# Patient Record
Sex: FEMALE | Race: BLACK OR AFRICAN AMERICAN | NOT HISPANIC OR LATINO | Employment: STUDENT | URBAN - METROPOLITAN AREA
[De-identification: names, ages, dates, MRNs, and addresses within clinical notes are randomized per-mention and may not be internally consistent; named-entity substitution may affect disease eponyms.]

---
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
## 2025-01-04 DIAGNOSIS — F90.0 ADHD (ATTENTION DEFICIT HYPERACTIVITY DISORDER), INATTENTIVE TYPE: ICD-10-CM

## 2025-01-06 RX ORDER — LISDEXAMFETAMINE DIMESYLATE 30 MG/1
30 CAPSULE ORAL EVERY MORNING
Qty: 30 CAPSULE | Refills: 0 | Status: SHIPPED | OUTPATIENT
Start: 2025-01-06

## 2025-01-31 DIAGNOSIS — F90.0 ADHD (ATTENTION DEFICIT HYPERACTIVITY DISORDER), INATTENTIVE TYPE: ICD-10-CM

## 2025-01-31 RX ORDER — LISDEXAMFETAMINE DIMESYLATE 30 MG/1
30 CAPSULE ORAL EVERY MORNING
Qty: 30 CAPSULE | Refills: 0 | Status: SHIPPED | OUTPATIENT
Start: 2025-01-31

## 2025-02-12 ENCOUNTER — PATIENT MESSAGE (OUTPATIENT)
Dept: PSYCHIATRY | Facility: CLINIC | Age: 20
End: 2025-02-12
Payer: OTHER GOVERNMENT

## 2025-03-01 RX ORDER — ESCITALOPRAM OXALATE 10 MG/1
15 TABLET ORAL DAILY
Qty: 135 TABLET | Refills: 1 | Status: SHIPPED | OUTPATIENT
Start: 2025-03-01 | End: 2026-03-01

## 2025-03-02 DIAGNOSIS — F90.0 ADHD (ATTENTION DEFICIT HYPERACTIVITY DISORDER), INATTENTIVE TYPE: ICD-10-CM

## 2025-03-03 RX ORDER — LISDEXAMFETAMINE DIMESYLATE 30 MG/1
30 CAPSULE ORAL EVERY MORNING
Qty: 30 CAPSULE | Refills: 0 | Status: SHIPPED | OUTPATIENT
Start: 2025-03-03 | End: 2025-03-05 | Stop reason: SDUPTHER

## 2025-03-05 ENCOUNTER — TELEPHONE (OUTPATIENT)
Dept: PSYCHIATRY | Facility: CLINIC | Age: 20
End: 2025-03-05
Payer: OTHER GOVERNMENT

## 2025-03-05 ENCOUNTER — OFFICE VISIT (OUTPATIENT)
Dept: PSYCHIATRY | Facility: CLINIC | Age: 20
End: 2025-03-05
Payer: OTHER GOVERNMENT

## 2025-03-05 VITALS
DIASTOLIC BLOOD PRESSURE: 75 MMHG | BODY MASS INDEX: 30.71 KG/M2 | HEIGHT: 61 IN | HEART RATE: 99 BPM | WEIGHT: 162.69 LBS | SYSTOLIC BLOOD PRESSURE: 119 MMHG

## 2025-03-05 DIAGNOSIS — F90.0 ADHD (ATTENTION DEFICIT HYPERACTIVITY DISORDER), INATTENTIVE TYPE: ICD-10-CM

## 2025-03-05 DIAGNOSIS — F41.9 ANXIETY: Primary | ICD-10-CM

## 2025-03-05 DIAGNOSIS — F33.0 MILD EPISODE OF RECURRENT MAJOR DEPRESSIVE DISORDER: ICD-10-CM

## 2025-03-05 PROCEDURE — 99214 OFFICE O/P EST MOD 30 MIN: CPT | Mod: S$PBB,,, | Performed by: PSYCHOLOGIST

## 2025-03-05 PROCEDURE — 99214 OFFICE O/P EST MOD 30 MIN: CPT | Mod: PBBFAC,PO | Performed by: PSYCHOLOGIST

## 2025-03-05 PROCEDURE — 90833 PSYTX W PT W E/M 30 MIN: CPT | Mod: ,,, | Performed by: PSYCHOLOGIST

## 2025-03-05 PROCEDURE — 99999 PR PBB SHADOW E&M-EST. PATIENT-LVL IV: CPT | Mod: PBBFAC,,, | Performed by: PSYCHOLOGIST

## 2025-03-05 PROCEDURE — G2211 COMPLEX E/M VISIT ADD ON: HCPCS | Mod: S$PBB,,, | Performed by: PSYCHOLOGIST

## 2025-03-05 RX ORDER — LISDEXAMFETAMINE DIMESYLATE 30 MG/1
30 CAPSULE ORAL EVERY MORNING
Qty: 30 CAPSULE | Refills: 0 | Status: SHIPPED | OUTPATIENT
Start: 2025-03-05 | End: 2025-03-06 | Stop reason: SDUPTHER

## 2025-03-05 RX ORDER — CLOBETASOL PROPIONATE 0.5 MG/G
OINTMENT TOPICAL
COMMUNITY
Start: 2025-01-23

## 2025-03-05 RX ORDER — ESCITALOPRAM OXALATE 10 MG/1
15 TABLET ORAL DAILY
Qty: 135 TABLET | Refills: 1 | Status: SHIPPED | OUTPATIENT
Start: 2025-03-05 | End: 2026-03-05

## 2025-03-05 NOTE — TELEPHONE ENCOUNTER
Tired calling the pharmacy x 3 kept getting disconnected. Reaching out to obtain information about denial on prescription Vyvanse . Please refer to documentation on 12/6/2024. Cancel and resend brand name only.   Katheryn

## 2025-03-05 NOTE — PROGRESS NOTES
Outpatient Psychiatry Follow-Up Visit    Clinical Status of Patient: Outpatient (Ambulatory)  03/05/2025     Chief Complaint: 19 year old female presenting today for a follow-up.       Interval History and Content of Current Session:  Interim Events/Subjective Report/Content of Current Session:  follow-up appointment.    Pt is a 19 year old female with past psychiatric hx of depression, anxiety, ADHD who presents for follow-up treatment. Pt feels her Vyvanse is working well but pt keeps failing an anatomy lab as she has to spell correctly and she continually makes mistakes on this. Pt is anxious sometimes in social situations and would like to start counseling. Pt believes Lexapro is working as well as it can be. Pt was prescribed Atarax for a derm condition but is taking rarely for anxiety/sleep PRN. Pt denied any other major changes or new stressors.    Past Psychiatric hx: none    Past Medical hx:   Past Medical History:   Diagnosis Date    Narcolepsy         Interim hx:  Medication changes last visit:  increased Lexapro dosage to 15mg Q D  Anxiety: moderate - unchanged  Depression: mild - unchanged     Denies suicidal/homicidal ideations.  Denies hopelessness/worthlessness.    Denies auditory/visual hallucinations      Alcohol: pt denied  Drug: pt denied  Caffeine: minimal use  Tobacco: pt denied      Review of Systems   PSYCHIATRIC: Pertinent items are noted in the narrative.        CONSTITUTIONAL: weight stable    Past Medical, Family and Social History: The patient's past medical, family and social history have been reviewed and updated as appropriate within the electronic medical record. See encounter notes.     Current Psychiatric Medication:  Lexapro 15mg once daily and Vyvanse 30mg Q AM     Compliance: yes      Side effects: pt denied     Risk Parameters:  Patient reports no suicidal ideation  Patient reports no homicidal ideation  Patient reports no self-injurious behavior  Patient reports no violent  behavior     Exam (detailed: at least 9 elements; comprehensive: all 15 elements)   Constitutional  Vitals:  Most recent vital signs, dated less than 90 days prior to this appointment, were reviewed. Pulse:  [99]   BP: (119)/(75)       General:  unremarkable, age appropriate, casual attire, good eye contact, good rapport       Musculoskeletal  Muscle Strength/Tone:  no flaccidity, no tremor    Gait & Station:  normal      Psychiatric                       Speech:  normal tone, normal rate, rhythm, and volume   Mood & Affect:   Depressed, anxious         Thought Process:   Goal directed; Linear    Associations:   intact   Thought Content:   No SI/HI, delusions, or paranoia, no AV/VH   Insight & Judgement:   Good, adequate to circumstances   Orientation:   grossly intact; alert and oriented x 4    Memory:  intact for content of interview    Language:  grossly intact, can repeat    Attention Span  : Grossly intact for content of interview   Fund of Knowledge:   intact and appropriate to age and level of education        Assessment and Diagnosis   Status/Progress: improving     Impression: Pt struggles with moderate depression and anxiety symptoms that are not well managed currently. Pt also reports attention/concentration concerns consistent with ADHD, predominantly inattentive type. Pt explained that her symptoms were present before the age of 12 and are cause impairment in more than one setting. Pt should continue in psychotherapy and psychiatric medication management.     Diagnosis(es):   1) Major Depressive Disorder, recurrent, mild  2) ADHD, predominately inattentive type    Intervention/Counseling/Treatment Plan   Medication Management:      Continue Vyvanse 30mg Q AM and Lexapro 15mg Q D    2. Start counseling      3. Call to report any worsening of symptoms or problems with the medication. Pt instructed to go to ER with thoughts of harming self, others    Psychotherapy:   Target symptoms: depression, anxiety,  attention/concentration  Why chosen therapy is appropriate versus another modality: CBT used; relevant to diagnosis, patient responds to this modality  Outcome monitoring methods: self-report, observation  Therapeutic intervention type: Cognitive Behavioral Therapy  Topics discussed/themes: building skills sets for symptom management, symptom recognition, nutrition, exercise  The patient's response to the intervention is good  Patient's response to treatment is: good.   The patient's progress toward treatment goals: improving  16 minutes spent with pt in psychotherapy and 5 minutes in medication management     Return to clinic: 6 months or earlier PRN    -Cognitive-Behavioral/Supportive therapy and psychoeducation provided  -R/B/SE's of medications discussed with the pt who expresses understanding and chooses to take medications as prescribed.   -Pt instructed to call clinic, 911 or go to nearest emergency room if sxs worsen or pt is in   crisis. The pt expresses understanding.    Hi Bearden, PhD, MP     Visit today included increased complexity associated with the care of the episodic problem associated with mental health. Addressed and managed the longitudinal care of the patient due to the serious and/or complex mental health diagnosis.

## 2025-03-06 DIAGNOSIS — F90.0 ADHD (ATTENTION DEFICIT HYPERACTIVITY DISORDER), INATTENTIVE TYPE: ICD-10-CM

## 2025-03-06 RX ORDER — LISDEXAMFETAMINE DIMESYLATE 30 MG/1
30 CAPSULE ORAL EVERY MORNING
Qty: 30 CAPSULE | Refills: 0 | Status: SHIPPED | OUTPATIENT
Start: 2025-03-06

## 2025-03-12 ENCOUNTER — OFFICE VISIT (OUTPATIENT)
Dept: PSYCHIATRY | Facility: CLINIC | Age: 20
End: 2025-03-12
Payer: OTHER GOVERNMENT

## 2025-03-12 DIAGNOSIS — F41.9 ANXIETY: ICD-10-CM

## 2025-03-12 DIAGNOSIS — F42.4 EXCORIATION (SKIN-PICKING) DISORDER: ICD-10-CM

## 2025-03-12 DIAGNOSIS — F33.0 MILD EPISODE OF RECURRENT MAJOR DEPRESSIVE DISORDER: ICD-10-CM

## 2025-03-12 DIAGNOSIS — F41.1 GAD (GENERALIZED ANXIETY DISORDER): Primary | ICD-10-CM

## 2025-03-12 DIAGNOSIS — F90.0 ADHD (ATTENTION DEFICIT HYPERACTIVITY DISORDER), INATTENTIVE TYPE: ICD-10-CM

## 2025-03-12 PROCEDURE — 99212 OFFICE O/P EST SF 10 MIN: CPT | Mod: PBBFAC,PO | Performed by: SOCIAL WORKER

## 2025-03-12 PROCEDURE — 99999 PR PBB SHADOW E&M-EST. PATIENT-LVL II: CPT | Mod: PBBFAC,,, | Performed by: SOCIAL WORKER

## 2025-03-18 NOTE — PROGRESS NOTES
Psychiatry Initial Visit (PhD/LCSW)  Diagnostic Interview - CPT 73807    Date: 3/12/2025    Site: [facility]     Referral source: Hi Bearden, PhD,     Clinical status of patient: Outpatient    Sarah Hernandez, a 19 y.o. female, for initial evaluation visit.  Met with patient.    Chief complaint/reason for encounter: attention deficit, depression, and anxiety  INTERACTIVE COMPLEXITY:  Expressive communication skills to adequately to explain symptoms and response to treatment are impacted by emotional state, requiring the use of interactive methods and materials to elicit data.    LCSW met with Pt individually for initial evaluation. Pt was informed of LCSW role/responsibilities, purpose of this visit, process of evaluation, and expectations for therapy. LCSW reviewed Pt's rights and Pt provided informed consent for treatment. The following report includes information from interview with Pt, clinical observations, and chart review.    History of present illness: Pt is a 19 year old female with psychiatric hx of depression, anxiety, ADHD who presents to establish psychotherapy care to address social anxiety. Pt neatly dressed, cooperative, reports mood is anxious about school and family. Pt under the care of Dr. Bearden, Psychology, for medication management. She is currently prescribed Vyvanse 30mg in AM and Lexapro 15mg , 1 tablet daily.  Pt feels her Vyvanse is working well but struggles at college with an anatomy lab due to having to spell correctly and pt continues to make spelling errors. Pt also reports feeling anxious in social situations. Pt reports getting overwhelmed when overstimulated. Triggers are usually large crowds, loud noises, food textures, clothing textures, and people moving around. She reports anxiety related to public speaking, speaking in front of class for class presentations as well. Pt has several good friends and sees tem regularly. Closest friends are Daylynn  "and Pavel.  Pt reports therapy with Dr. Otto was very helpful. She received psychotherapy services from Dr. Mason Otto, PsD 8/30/24 through 11/30/24 through the [facility]. Therapy focused on relationship with mother and sister, as well as excoriation and to improve mood.  Pt Diagnoses : MDD, mild ; ADHD,  inattentive ; MARIEL ; Excoriation (skin picking disorder). Dr. Otto recommended pt seek longer term therapy to address family dynamics and self image.    Pt lives at home with parents, older sister (bio mother) and 15 yo younger sister ( bio sister, Dx Autism Spectrum ). Pt older sister has dx of Bipolar Disorder. Pt parents - (maternal Gpx ) are . Father is more comforting. Both parents are retired. They tend to yell, get angry with pt. They are comforting and understanding with pt younger sister. Mom tends to excuse older sister's behaviors. Pt is struggling with a current college course - Anatomy lab because she must spell all of the words correctly or it is counted as being wrong. Pt reports having speech therapy in 1st grade and reports anxiety about speaking, as she gets her words fumbled up, make take longer to answer. Pt also has narcolepsy and gets distracted a lot at home, school and in social situations.  Due to time constraints, will formulate therapy goals with pt at next appt .Will also explore SELU resources that may help pt. Pt denies any current SI/HI. Denies any current A/VH.       From appt with Dr. Otto on 10/3/24:  Patient shares since starting the Lexapro, she has noticed increased brain fog or "dissociation" as she describes. She also notes the urge to want to chew/bite on something. Patient does endorse a calmer state of being however. She notes she is less reactive and has had no anger outburst. Patient was encouraged to remain in communication with her provider as her system adjusts to the new medication. Patient reports her skin picking " "behavior has decreased minimally however her skin is repairing with the consistent use of her cream. Praised patient for engaging in behavior activation plan. Patient also indicated challenges with overstimulation, particularly with loud noises/yelling and particular textures. Patient was provided with resources for overstimulation such as loops and chewables.     From 11/11/24 appt with Dr. Otto: ongoing issues with inattention and her narcolepsy. Recent adjustment were made to her medication so she is waiting to see if that helps. Patient's issues with inattention appear to be better attributed to her anxiety, specifically with anatomy class and the likelihood she will need to retake for the 3rd time. Patient shares she still struggles with the dynamics in her family. She admits to passive feelings of jealousy towards her younger sister due to the consideration her parents and older sister show her in regards to her mental health issues. Patient feels largely dismissed by her older sister.     From 10/16/24 Diagnostic Evaluation with Dr. Bearden: Pt had a somewhat stressful childhood in that her younger sister struggled with Autism and so she felt as if some of her struggles, including attention/concentration concerns, were missed. Pt struggled academically and socially in school and had to endure bullying. Pt was a C student in  and noted longstanding physiological and cognitive worry. Pt was also diagnosed with narcolepsy and treated with low-dose Vyvanse for the past couple of years. Pt is now in her Sophomore year of college and struggling with attention/concentration, as well as her anxiety and depression.    Depression symptoms: "feeling like crap", negative self-talk. Anxiety symptoms: Pt noted excessive worry and high physiological anxiety with infrequent panic attacks. Pt denied symptoms consistent with MARIEL, OCD, phobias, or social anxiety.     Dx: ADHD, inattentive and Major Depressive Disorder, Mild " .            11/21/2024    11:14 AM 11/4/2024    12:34 PM 10/24/2024     5:19 PM 10/10/2024    11:43 AM 9/26/2024    12:13 PM 9/16/2024     9:52 AM 8/29/2024     3:54 PM   Results of the PHQ8   Little interest or pleasure in doing things Several days More than half the days More than half the days Several days More than half the days More than half the days More than half the days   Feeling down, depressed, or hopeless More than half the days Several days Several days Several days More than half the days More than half the days Several days   Trouble falling or staying asleep, or sleeping too much Not at all More than half the days Nearly every day Several days Nearly every day More than half the days Not at all   Feeling tired or having little energy More than half the days More than half the days Nearly every day Nearly every day Several days More than half the days More than half the days   Poor appetite or overeating Nearly every day Several days Nearly every day Nearly every day Nearly every day Nearly every day Several days   Feeling bad about yourself - or that you are a failure or have let yourself or your family down Several days Several days Nearly every day Not at all More than half the days Nearly every day Nearly every day   Trouble concentrating on things, such as reading the newspaper or watching television More than half the days Nearly every day Nearly every day Nearly every day Nearly every day Nearly every day Several days   Moving or speaking so slowly that other people could have noticed. Or the opposite - being so fidgety or restless that you have been moving around a lot more than usual Nearly every day Nearly every day Nearly every day Nearly every day Nearly every day Nearly every day Nearly every day   Total Score  14 15 21 15 19 20 13           11/21/2024    11:13 AM 11/4/2024    12:33 PM 10/24/2024     5:19 PM   GAD7   1. Feeling nervous, anxious, or on edge? 2 2 1   2. Not being able to  stop or control worrying? 1 1 1   3. Worrying too much about different things? 2 2 1   4. Trouble relaxing? 2 2 2   5. Being so restless that it is hard to sit still? 2 3 3   6. Becoming easily annoyed or irritable? 1 1 1   7. Feeling afraid as if something awful might happen? 0 1 0   8. If you checked off any problems, how difficult have these problems made it for you to do your work, take care of things at home, or get along with other people? 1 2 2   MARIEL-7 Score 10  12  9        Patient-reported       Pain: knee pain 4    Symptoms:   Mood: depressed mood, insomnia, fatigue, worthlessness/guilt, poor concentration, and negative self talk. Pt denies any current SI/HI. Denies any current A/VH.     Anxiety: excessive anxiety/worry, restlessness/keyed up, irritability, muscle tension, specific phobias, and social anxiety, nervousness,   Substance abuse: denied  Cognitive functioning:  difficulty with spelling  Health behaviors:  Narcolepsy    Psychiatric history: psychotropic management by PCP, has participated in counseling/psychotherapy on an outpatient basis in the past, and currently under psychiatric care      Medical history:   Past Medical History:   Diagnosis Date    Narcolepsy          Current Outpatient Medications on File Prior to Visit   Medication Sig Dispense Refill    betamethasone dipropionate 0.05 % cream Apply topically 2 (two) times daily. Use sparingly on severe eczema 45 g 0    clobetasol 0.05% (TEMOVATE) 0.05 % Oint Apply topically.      EScitalopram oxalate (LEXAPRO) 10 MG tablet Take 1.5 tablets (15 mg total) by mouth once daily. 135 tablet 1    famotidine (PEPCID) 20 MG tablet Take 1 tablet (20 mg total) by mouth once daily. 30 tablet 2    hydrOXYzine HCL (ATARAX) 25 MG tablet Take 1 tablet (25 mg total) by mouth 3 (three) times daily. 30 tablet 0    loratadine (CLARITIN) 10 mg tablet Take 1 tablet (10 mg total) by mouth once daily. 90 tablet 3    norgestimate-ethinyl estradioL (ORTHO  TRI-CYCLEN LO) 0.18/0.215/0.25 mg-25 mcg tablet Take 1 tablet by mouth once daily. 30 tablet 11    tacrolimus (PROTOPIC) 0.1 % ointment Apply topically 2 (two) times daily. 60 g 0    triamcinolone acetonide 0.1% (KENALOG) 0.1 % cream Apply topically 2 (two) times daily. 45 g 2    VYVANSE 30 mg capsule Take 1 capsule (30 mg total) by mouth every morning. 30 capsule 0     No current facility-administered medications on file prior to visit.       Past Surgical History:   Procedure Laterality Date    LAPAROSCOPIC APPENDECTOMY N/A 6/25/2022    Procedure: APPENDECTOMY, LAPAROSCOPIC;  Surgeon: Milton Alexander MD;  Location: [facility] OR;  Service: General;  Laterality: N/A;    LAPAROSCOPIC CHOLECYSTECTOMY N/A 6/25/2022    Procedure: CHOLECYSTECTOMY, LAPAROSCOPIC;  Surgeon: Milton Alexander MD;  Location: [facility] OR;  Service: General;  Laterality: N/A;     Family history of psychiatric illness:  Mother - Bipolar Disorder ; Sister - Autism Spectrum Disorder    Social history (marriage, employment, etc.): Pt Single, no children. Is Sophomore in college, studying kinesiology at [employer] Pt lives at home with parents, older sister (bio mother) and 17 yo younger sister ( bio sister, Dx Autism Spectrum ). Pt older sister has dx of Bipolar Disorder. Pt parents - (maternal Gpx ) are . Father is more comforting. Both parents are retired. They tend to yell, get angry with pt. They are comforting and understanding with pt younger sister. Mom tends to excuse older sister's behaviors. Pt is struggling with a current college course - Anatomy lab because she must spell all of the words correctly or it is counted as being wrong. Pt reports having speech therapy in 1st grade and reports anxiety about speaking, as she gets her words fumbled up, make take longer to answer. Pt also has narcolepsy and gets distracted a lot at home, school and in social situations.      Substance use:   Alcohol: none   Drugs: none   Tobacco: none   Caffeine:  none    Current medications and drug reactions (include OTC, herbal): see medication list    Strengths and liabilities: Strength: Patient accepts guidance/feedback, Strength: Patient is expressive/articulate., Strength: Patient is intelligent., Strength: Patient is motivated for change., Strength: Patient is physically healthy., Strength: Patient has reasonable judgment., Liability: Patient lacks coping skills., needs more supports re college and family / social.    Current Evaluation:     Mental Status Exam:  General Appearance:  unremarkable, age appropriate   Speech: normal tone, normal rate, normal pitch, normal volume      Level of Cooperation: cooperative      Thought Processes: normal and logical   Mood: anxious      Thought Content: normal, no suicidality, no homicidality, delusions, or paranoia   Affect: congruent and appropriate   Orientation: Oriented x3   Memory: intact   Attention Span & Concentration: intact   Fund of General Knowledge: intact and appropriate to age and level of education   Abstract Reasoning: No concerns noted   Judgment & Insight: fair     Language  intact     Diagnostic Impression - Plan:       ICD-10-CM ICD-9-CM   1. MARIEL (generalized anxiety disorder)  F41.1 300.02   2. Mild episode of recurrent major depressive disorder  F33.0 296.31   3. ADHD (attention deficit hyperactivity disorder), inattentive type  F90.0 314.00   4. Excoriation (skin-picking) disorder  F42.4 698.4   5. Anxiety  F41.9 300.00       Plan:individual psychotherapy and medication management by physician. Pt under the care of Dr. Bearden.   Treatment Goals:  Specify outcomes written in observable, behavioral terms:   Anxiety: reducing negative automatic thoughts, reducing physical symptoms of anxiety, and reducing time spent worrying (<30 minutes/day)  Depression: acquiring relapse prevention skills and reducing negative automatic thoughts    Treatment Plan/Recommendations:   Medication Management: Follow up with  Dr. Bearden.  The treatment plan and follow up plan were reviewed with the patient.  8-349-686-KGVO is the suicide prevention hotline.  If there are thoughts of death, dying, or suicide please call the hotline and go to the ER for evaluation.    Return to Clinic: 1 week, earlier if needed. Pt to go to ED or call 911 if symptoms worsen or if she has thoughts of harming self and /or others. Pt verbalized understanding.       ARVIND BradshawW

## 2025-03-19 ENCOUNTER — OFFICE VISIT (OUTPATIENT)
Dept: PSYCHIATRY | Facility: CLINIC | Age: 20
End: 2025-03-19
Payer: OTHER GOVERNMENT

## 2025-03-19 DIAGNOSIS — F33.0 MILD EPISODE OF RECURRENT MAJOR DEPRESSIVE DISORDER: ICD-10-CM

## 2025-03-19 DIAGNOSIS — F41.1 GAD (GENERALIZED ANXIETY DISORDER): Primary | ICD-10-CM

## 2025-03-19 DIAGNOSIS — F42.4 EXCORIATION (SKIN-PICKING) DISORDER: ICD-10-CM

## 2025-03-19 DIAGNOSIS — F90.0 ADHD (ATTENTION DEFICIT HYPERACTIVITY DISORDER), INATTENTIVE TYPE: ICD-10-CM

## 2025-03-19 PROCEDURE — 90785 PSYTX COMPLEX INTERACTIVE: CPT | Mod: ,,, | Performed by: SOCIAL WORKER

## 2025-03-19 PROCEDURE — 90837 PSYTX W PT 60 MINUTES: CPT | Mod: ,,, | Performed by: SOCIAL WORKER

## 2025-03-19 NOTE — PROGRESS NOTES
Individual Psychotherapy (PhD/LCSW)    3/19/2025    Site:  [facility]         Therapeutic Intervention: Met with patient.  Outpatient - Insight oriented psychotherapy 60 min - CPT code 99495, Outpatient - Behavior modifying psychotherapy 60 min - CPT code 68849, Outpatient - Supportive psychotherapy 60 min - CPT Code 75754, Outpatient - Interactive psychotherapy 60 min - CPT code 46035, and Outpatient - Interactive complexity - CPT code 62501  INTERACTIVE COMPLEXITY:  Expressive communication skills to adequately to explain symptoms and response to treatment are impacted by emotional state, requiring the use of interactive methods and materials to elicit data.    Chief complaint/reason for encounter: depression, anxiety, and school / academic, social, family stress     Interval history and content of current session: First f/up. Pt reports very anxious wanting to move out and not knowing how to tell parents (mg parents). Pt appears anxious, focused on rapport and as session progresses pt able to talk about older sister, who is pt bio mom, being very critical of pt and highly emotionally reactive, gets her way, ad pt younger sister w autism dx able to get what she wants and lots of awards and rewards but pt struggles and is highly criticized by mom and pt older sister.  They will point out everything that can go wrong vs being supportive and helpful. Focused on pt strengths -  and loved to weld. Pt also dating Daylyn, boyfriend and best friend. Pt wants to live on her own and has ideas about finances, where to live. Pt denies any current SI/HI. Denies any current A/VH.       Treatment plan:  Target symptoms: depression, anxiety , adjustment  Why chosen therapy is appropriate versus another modality: relevant to diagnosis, patient responds to this modality, evidence based practice  Outcome monitoring methods: self-report, observation  Therapeutic intervention type: insight oriented psychotherapy, behavior  modifying psychotherapy, supportive psychotherapy, interactive psychotherapy    Risk parameters:  Patient reports no suicidal ideation  Patient reports no homicidal ideation  Patient reports no self-injurious behavior  Patient reports no violent behavior    Verbal deficits:  pt reports hx of speech issues, struggles with getting right words out or slowly processing. Pt sfot spoken as well.     Patient's response to intervention:  The patient's response to intervention is accepting.    Progress toward goals and other mental status changes:  The patient's progress toward goals is fair .    Diagnosis:     ICD-10-CM ICD-9-CM   1. MARIEL (generalized anxiety disorder)  F41.1 300.02   2. Mild episode of recurrent major depressive disorder  F33.0 296.31   3. ADHD (attention deficit hyperactivity disorder), inattentive type  F90.0 314.00   4. Excoriation (skin-picking) disorder  F42.4 698.4     Plan:individual psychotherapy and medication management by physician. Pt under the care of Dr. Bearden.   Treatment Goals:  Specify outcomes written in observable, behavioral terms:   Anxiety: reducing negative automatic thoughts, reducing physical symptoms of anxiety, and reducing time spent worrying (<30 minutes/day)  Depression: acquiring relapse prevention skills and reducing negative automatic thoughts     Treatment Plan/Recommendations:   Medication Management: Follow up with Dr. Bearden.  The treatment plan and follow up plan were reviewed with the patient.  6-622-878-BUXV is the suicide prevention hotline.  If there are thoughts of death, dying, or suicide please call the hotline and go to the ER for evaluation.     Return to Clinic: 1 week, earlier if needed. Pt to go to ED or call 911 if symptoms worsen or if she has thoughts of harming self and /or others. Pt verbalized understanding.   Return to clinic:  1-2 weeks, earlier if needed.     Length of Service (minutes):  55 min

## 2025-03-26 ENCOUNTER — OFFICE VISIT (OUTPATIENT)
Dept: PSYCHIATRY | Facility: CLINIC | Age: 20
End: 2025-03-26
Payer: OTHER GOVERNMENT

## 2025-03-26 DIAGNOSIS — F90.0 ADHD (ATTENTION DEFICIT HYPERACTIVITY DISORDER), INATTENTIVE TYPE: ICD-10-CM

## 2025-03-26 DIAGNOSIS — F42.4 EXCORIATION (SKIN-PICKING) DISORDER: ICD-10-CM

## 2025-03-26 DIAGNOSIS — F43.29 STRESS AND ADJUSTMENT REACTION: ICD-10-CM

## 2025-03-26 DIAGNOSIS — F41.1 GAD (GENERALIZED ANXIETY DISORDER): Primary | ICD-10-CM

## 2025-03-26 DIAGNOSIS — F33.0 MILD EPISODE OF RECURRENT MAJOR DEPRESSIVE DISORDER: ICD-10-CM

## 2025-03-26 PROCEDURE — 90837 PSYTX W PT 60 MINUTES: CPT | Mod: ,,, | Performed by: SOCIAL WORKER

## 2025-03-26 PROCEDURE — 90785 PSYTX COMPLEX INTERACTIVE: CPT | Mod: ,,, | Performed by: SOCIAL WORKER

## 2025-03-26 PROCEDURE — 99999 PR PBB SHADOW E&M-EST. PATIENT-LVL II: CPT | Mod: PBBFAC,,, | Performed by: SOCIAL WORKER

## 2025-03-26 PROCEDURE — 99212 OFFICE O/P EST SF 10 MIN: CPT | Mod: PBBFAC,PO | Performed by: SOCIAL WORKER

## 2025-03-31 NOTE — PROGRESS NOTES
Individual Psychotherapy (PhD/LCSW)    3/26/2025    Site:  [facility]         Therapeutic Intervention: Met with patient.  Outpatient - Insight oriented psychotherapy 60 min - CPT code 84610, Outpatient - Behavior modifying psychotherapy 60 min - CPT code 76306, Outpatient - Supportive psychotherapy 60 min - CPT Code 69964, Outpatient - Interactive psychotherapy 60 min - CPT code 21731, and Outpatient - Interactive complexity - CPT code 79261  INTERACTIVE COMPLEXITY:  Expressive communication skills to adequately to explain symptoms and response to treatment are impacted by emotional state, requiring the use of interactive methods and materials to elicit data.    Chief complaint/reason for encounter: depression, anxiety, and school / academic, social, family stress     Interval history and content of current session: Pt reports she is very anxious and is picking her skin more and more due to her older sister's behavior at home. Pt feeling judged, picked apart, criticized , ignored, reports trying to stay away from older sister as older sister has dramatic moods and behaviors that are attention seeking and critical of pt. Older sister is patient's bio mother and pt feels anxious, tense, overwhelmed at home.  Pt reports a history of being bullied at school and at home re communication and processing things a bit slower. 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 Focused on naming her feelings, processing and solution focused skills , problem solving skills as well as pt strengths and supports. Also discussed independent living skills with pt.  Pt also dating Daylyn, boyfriend and best friend. Pt wants to live on her own and has ideas about finances, where to live. Pt denies any current SI/HI. Denies any current A/VH.       Treatment plan:  Target symptoms: depression, anxiety , adjustment  Why chosen therapy is appropriate versus another modality: relevant to diagnosis,  patient responds to this modality, evidence based practice  Outcome monitoring methods: self-report, observation  Therapeutic intervention type: insight oriented psychotherapy, behavior modifying psychotherapy, supportive psychotherapy, interactive psychotherapy    Risk parameters:  Patient reports no suicidal ideation  Patient reports no homicidal ideation  Patient reports no self-injurious behavior  Patient reports no violent behavior    Verbal deficits:  pt reports hx of speech issues, struggles with getting right words out or slowly processing. Pt sfot spoken as well.     Patient's response to intervention:  The patient's response to intervention is accepting.    Progress toward goals and other mental status changes:  The patient's progress toward goals is fair .    Diagnosis:     ICD-10-CM ICD-9-CM   1. MARIEL (generalized anxiety disorder)  F41.1 300.02   2. Mild episode of recurrent major depressive disorder  F33.0 296.31   3. ADHD (attention deficit hyperactivity disorder), inattentive type  F90.0 314.00   4. Excoriation (skin-picking) disorder  F42.4 698.4   5. Stress and adjustment reaction  F43.29 309.89     Plan:individual psychotherapy and medication management by physician. Pt under the care of Dr. Bearden.   Treatment Goals:  Specify outcomes written in observable, behavioral terms:   Anxiety: reducing negative automatic thoughts, reducing physical symptoms of anxiety, and reducing time spent worrying (<30 minutes/day)  Depression: acquiring relapse prevention skills and reducing negative automatic thoughts     Treatment Plan/Recommendations:   Medication Management: Follow up with Dr. Bearden.  The treatment plan and follow up plan were reviewed with the patient.  7-338-648-PCKX is the suicide prevention hotline.  If there are thoughts of death, dying, or suicide please call the hotline and go to the ER for evaluation.     Return to Clinic: 1 week, earlier if needed. Pt to go to ED or call 911 if symptoms  worsen or if she has thoughts of harming self and /or others. Pt verbalized understanding. Length of Service (minutes):  55 min

## 2025-04-04 DIAGNOSIS — F90.0 ADHD (ATTENTION DEFICIT HYPERACTIVITY DISORDER), INATTENTIVE TYPE: ICD-10-CM

## 2025-04-04 RX ORDER — LISDEXAMFETAMINE DIMESYLATE 30 MG/1
30 CAPSULE ORAL EVERY MORNING
Qty: 30 CAPSULE | Refills: 0 | Status: SHIPPED | OUTPATIENT
Start: 2025-04-04

## 2025-04-16 ENCOUNTER — OFFICE VISIT (OUTPATIENT)
Dept: PSYCHIATRY | Facility: CLINIC | Age: 20
End: 2025-04-16
Payer: OTHER GOVERNMENT

## 2025-04-16 DIAGNOSIS — F41.1 GAD (GENERALIZED ANXIETY DISORDER): Primary | ICD-10-CM

## 2025-04-16 DIAGNOSIS — F42.4 EXCORIATION (SKIN-PICKING) DISORDER: ICD-10-CM

## 2025-04-16 DIAGNOSIS — F41.9 ANXIETY: ICD-10-CM

## 2025-04-16 DIAGNOSIS — F43.29 STRESS AND ADJUSTMENT REACTION: ICD-10-CM

## 2025-04-16 DIAGNOSIS — F33.0 MILD EPISODE OF RECURRENT MAJOR DEPRESSIVE DISORDER: ICD-10-CM

## 2025-04-16 DIAGNOSIS — F90.0 ADHD (ATTENTION DEFICIT HYPERACTIVITY DISORDER), INATTENTIVE TYPE: ICD-10-CM

## 2025-04-16 PROCEDURE — 99212 OFFICE O/P EST SF 10 MIN: CPT | Mod: PBBFAC,PO | Performed by: SOCIAL WORKER

## 2025-04-16 PROCEDURE — 99999 PR PBB SHADOW E&M-EST. PATIENT-LVL II: CPT | Mod: PBBFAC,,, | Performed by: SOCIAL WORKER

## 2025-04-16 PROCEDURE — 90837 PSYTX W PT 60 MINUTES: CPT | Mod: ,,, | Performed by: SOCIAL WORKER

## 2025-04-16 PROCEDURE — 90785 PSYTX COMPLEX INTERACTIVE: CPT | Mod: ,,, | Performed by: SOCIAL WORKER

## 2025-04-21 NOTE — PROGRESS NOTES
Individual Psychotherapy (PhD/LCSW)    4/16/2025    Site:  [facility]         Therapeutic Intervention: Met with patient.  Outpatient - Insight oriented psychotherapy 60 min - CPT code 63405, Outpatient - Behavior modifying psychotherapy 60 min - CPT code 01344, Outpatient - Supportive psychotherapy 60 min - CPT Code 53128, Outpatient - Interactive psychotherapy 60 min - CPT code 27453, and Outpatient - Interactive complexity - CPT code 18419  INTERACTIVE COMPLEXITY:  Expressive communication skills to adequately to explain symptoms and response to treatment are impacted by emotional state, requiring the use of interactive methods and materials to elicit data.    Chief complaint/reason for encounter: depression, anxiety, and school / academic, social, family stress     Interval history and content of current session: Pt casually dressed, appears anxious, down, reports she is feeling down and tired of having to ask parents if she can leave the house or go to a movie, because parents worry about her when she is not home. Feels the house is negative, re mom and older sister. Feeling like they talk over her, minimize any stressor she may mention, therese older sister. Pt is picking her skin more as well.  Pt feeling judged, picked apart, criticized , ignored, reports trying to stay away from older sister , but mom and older sister will make comment about pt going to her room.  Explored assertive communication and I statements. Pt hesitant to express her feelings as they are not well received by mom/ older sister. Pt does feel that her dad is supportive no matter what, but tends to defer to mom.     Pt reports low motivation, was depressed and low in 10th grade and pushed family to find out why pt was always falling asleep - was dx with narcolepsy in 10th grade. Pt reports feeling like something is wrong with her because she has low libido, low interest in sex and based on social media and people in class,high school,  pt telling self that sex should be on her mind a lot. Provided support, validation, reflective  listening and explored with pt diff between low libido, low interest vs aromantic, asexual, as well as explored with pt topics about sexual attraction, gender and family of origin. Pt has mentioned several times and discussed today, that mom and older sister have tried to make pt wear dresses and girlie things her whole life but she feels uncomfortable in clothing like this, prefers neutral or more masculine clothing / appearance. Pt identifies as a straight female at this time , does get upset when family makes judgmental comments about others. Will explore more at next appt.due to being over time today. Focused on self calming, naming her feelings, processing and solution focused skills , problem solving skills as well as pt strengths and supports. Also discussed independent living skills with pt.   Pt denies any current SI/HI. Denies any current A/VH.       Treatment plan:  Target symptoms: depression, anxiety , adjustment  Why chosen therapy is appropriate versus another modality: relevant to diagnosis, patient responds to this modality, evidence based practice  Outcome monitoring methods: self-report, observation  Therapeutic intervention type: insight oriented psychotherapy, behavior modifying psychotherapy, supportive psychotherapy, interactive psychotherapy    Risk parameters:  Patient reports no suicidal ideation  Patient reports no homicidal ideation  Patient reports no self-injurious behavior  Patient reports no violent behavior    Verbal deficits:  pt reports hx of speech issues, struggles with getting right words out or slowly processing. Pt soft spoken as well.     Patient's response to intervention:  The patient's response to intervention is accepting.    Progress toward goals and other mental status changes:  The patient's progress toward goals is fair .    Diagnosis:     ICD-10-CM ICD-9-CM   1. MARIEL  (generalized anxiety disorder)  F41.1 300.02   2. ADHD (attention deficit hyperactivity disorder), inattentive type  F90.0 314.00   3. Mild episode of recurrent major depressive disorder  F33.0 296.31   4. Stress and adjustment reaction  F43.29 309.89   5. Anxiety  F41.9 300.00   6. Excoriation (skin-picking) disorder  F42.4 698.4     Plan:individual psychotherapy and medication management by physician. Pt under the care of Dr. Bearden.   Treatment Goals:  Specify outcomes written in observable, behavioral terms:   Anxiety: reducing negative automatic thoughts, reducing physical symptoms of anxiety, and reducing time spent worrying (<30 minutes/day)  Depression: acquiring relapse prevention skills and reducing negative automatic thoughts     Treatment Plan/Recommendations:   Medication Management: Follow up with Dr. Bearden.  The treatment plan and follow up plan were reviewed with the patient.  0-799-238-AABM is the suicide prevention hotline.  If there are thoughts of death, dying, or suicide please call the hotline and go to the ER for evaluation.     Return to Clinic: 1 week, earlier if needed. Pt to go to ED or call 911 if symptoms worsen or if she has thoughts of harming self and /or others. Pt verbalized understanding. Length of Service (minutes):  70 min

## 2025-04-24 ENCOUNTER — OFFICE VISIT (OUTPATIENT)
Dept: PSYCHIATRY | Facility: CLINIC | Age: 20
End: 2025-04-24
Payer: OTHER GOVERNMENT

## 2025-04-24 DIAGNOSIS — F33.0 MILD EPISODE OF RECURRENT MAJOR DEPRESSIVE DISORDER: ICD-10-CM

## 2025-04-24 DIAGNOSIS — F41.1 GAD (GENERALIZED ANXIETY DISORDER): Primary | ICD-10-CM

## 2025-04-24 DIAGNOSIS — F90.0 ADHD (ATTENTION DEFICIT HYPERACTIVITY DISORDER), INATTENTIVE TYPE: ICD-10-CM

## 2025-04-24 PROCEDURE — 99212 OFFICE O/P EST SF 10 MIN: CPT | Mod: PBBFAC,PO | Performed by: SOCIAL WORKER

## 2025-04-24 PROCEDURE — 90834 PSYTX W PT 45 MINUTES: CPT | Mod: ,,, | Performed by: SOCIAL WORKER

## 2025-04-24 PROCEDURE — 90785 PSYTX COMPLEX INTERACTIVE: CPT | Mod: ,,, | Performed by: SOCIAL WORKER

## 2025-04-24 PROCEDURE — 99999 PR PBB SHADOW E&M-EST. PATIENT-LVL II: CPT | Mod: PBBFAC,,, | Performed by: SOCIAL WORKER

## 2025-04-30 ENCOUNTER — OFFICE VISIT (OUTPATIENT)
Dept: PSYCHIATRY | Facility: CLINIC | Age: 20
End: 2025-04-30
Payer: OTHER GOVERNMENT

## 2025-04-30 DIAGNOSIS — F90.0 ADHD (ATTENTION DEFICIT HYPERACTIVITY DISORDER), INATTENTIVE TYPE: ICD-10-CM

## 2025-04-30 DIAGNOSIS — F41.1 GAD (GENERALIZED ANXIETY DISORDER): Primary | ICD-10-CM

## 2025-04-30 DIAGNOSIS — F33.0 MILD EPISODE OF RECURRENT MAJOR DEPRESSIVE DISORDER: ICD-10-CM

## 2025-04-30 PROCEDURE — 90785 PSYTX COMPLEX INTERACTIVE: CPT | Mod: ,,, | Performed by: SOCIAL WORKER

## 2025-04-30 PROCEDURE — 99999 PR PBB SHADOW E&M-EST. PATIENT-LVL II: CPT | Mod: PBBFAC,,, | Performed by: SOCIAL WORKER

## 2025-04-30 PROCEDURE — 90834 PSYTX W PT 45 MINUTES: CPT | Mod: ,,, | Performed by: SOCIAL WORKER

## 2025-04-30 PROCEDURE — 99212 OFFICE O/P EST SF 10 MIN: CPT | Mod: PBBFAC,PO | Performed by: SOCIAL WORKER

## 2025-04-30 NOTE — PROGRESS NOTES
Individual Psychotherapy (PhD/LCSW)    4/30/2025    Site:  [facility]         Therapeutic Intervention:   Therapeutic Intervention: Met with patient.  Outpatient - Insight oriented psychotherapy 45 min - CPT code 31500, Outpatient - Behavior modifying psychotherapy 45 min - CPT code 97045, Outpatient - Supportive psychotherapy 45 min - CPT Code 72475, Outpatient - Interactive psychotherapy 45 min - CPT code 97645, and Outpatient - Interactive complexity - CPT code 38371      INTERACTIVE COMPLEXITY:  Expressive communication skills to adequately to explain symptoms and response to treatment are impacted by emotional state, requiring the use of interactive methods and materials to elicit data.    Chief complaint/reason for encounter: depression, anxiety, and school / academic, social, family stress     Interval history and content of current session: Pt casually dressed, appears down, reports feeling depressed, down, has low energy, low interest, low motivation.  Pt is picking her skin more as well.  Pt feeling judged, picked apart, criticized , ignored, reports trying to stay away from older sister , but mom and older sister will make comment about pt going to her room.  Explored assertive communication and I statements. Pt hesitant to express her feelings as they are not well received by mom / older sister. Pt does feel that her dad is supportive no matter what, but tends to defer to mom.     Pt reports low motivation and low interest. Feels trapped and stuck at home. Parents do not want her to be out after dark. Has online friends and a few in person friends, limited social outlets. Not interested in med eval at this time. Does feel anxious, sad when she is driving home, will get negative reactions if she shares her feelings or opinions by mom and older sister. Focused on CBT, thinking errors, self calming, naming her feelings, processing and solution focused skills , problem solving skills as well as pt  strengths and supports. Also discussed independent living skills with pt.   Pt denies any current SI/HI. Denies any current A/VH.       Treatment plan:  Target symptoms: depression, anxiety , adjustment  Why chosen therapy is appropriate versus another modality: relevant to diagnosis, patient responds to this modality, evidence based practice  Outcome monitoring methods: self-report, observation  Therapeutic intervention type: insight oriented psychotherapy, behavior modifying psychotherapy, supportive psychotherapy, interactive psychotherapy    Risk parameters:  Patient reports no suicidal ideation  Patient reports no homicidal ideation  Patient reports no self-injurious behavior  Patient reports no violent behavior    Verbal deficits: pt reports hx of speech issues, struggles with getting right words out or slowly processing. Pt soft spoken as well.     Patient's response to intervention:  The patient's response to intervention is accepting.    Progress toward goals and other mental status changes:  The patient's progress toward goals is fair .    ICD-10-CM ICD-9-CM    1. MARIEL (generalized anxiety disorder)  F41.1 300.02       2. ADHD (attention deficit hyperactivity disorder), inattentive type  F90.0 314.00       3. Mild episode of recurrent major depressive disorder  F33.0 296.31             Plan:individual psychotherapy and medication management by physician. Pt under the care of Dr. Bearden.   Treatment Goals:  Specify outcomes written in observable, behavioral terms:   Anxiety: reducing negative automatic thoughts, reducing physical symptoms of anxiety, and reducing time spent worrying (<30 minutes/day)  Depression: acquiring relapse prevention skills and reducing negative automatic thoughts     Treatment Plan/Recommendations:   Medication Management: Follow up with Dr. Bearden.  The treatment plan and follow up plan were reviewed with the patient.  [phone]-XFQP is the suicide prevention hotline.  If there are  thoughts of death, dying, or suicide please call the hotline and go to the ER for evaluation.     Return to Clinic: 1 week, earlier if needed. Pt to go to ED or call 911 if symptoms worsen or if she has thoughts of harming self and /or others. Pt verbalized understanding.   Length of Service (minutes): 50 min

## 2025-04-30 NOTE — PROGRESS NOTES
Individual Psychotherapy (PhD/LCSW)    4/30/2025    Site:  [facility]         Therapeutic Intervention:   Therapeutic Intervention: Met with patient.  Outpatient - Insight oriented psychotherapy 45 min - CPT code 95213, Outpatient - Behavior modifying psychotherapy 45 min - CPT code 47316, Outpatient - Supportive psychotherapy 45 min - CPT Code 67254, Outpatient - Interactive psychotherapy 45 min - CPT code 71985, and Outpatient - Interactive complexity - CPT code 51486      INTERACTIVE COMPLEXITY:  Expressive communication skills to adequately to explain symptoms and response to treatment are impacted by emotional state, requiring the use of interactive methods and materials to elicit data.    Chief complaint/reason for encounter: depression, anxiety, and school / academic, social, family stress     Interval history and content of current session: Pt casually dressed, appears down, reports feeling depressed, down, has low energy, low interest, low motivation.  Pt is picking her skin more as well.  Pt anxious re finals and has gone to tutoring at [employer]. Reports mood is impacted by mom and older's mood, walks on eggshells if sister is home. Pt looking forward to getting job over summer to be around people and have some funds. Encouraged this as well for social , independence / independent living skills, and healthy outlet.  Pt does feel that her dad is supportive no matter what, but tends to defer to mom.   Focused on pt strengths, symptom recognition, CBT, f/up with prescriber. Pt denies any current SI/HI. Denies any current A/VH.          Treatment plan:  Target symptoms: depression, anxiety , adjustment  Why chosen therapy is appropriate versus another modality: relevant to diagnosis, patient responds to this modality, evidence based practice  Outcome monitoring methods: self-report, observation  Therapeutic intervention type: insight oriented psychotherapy, behavior modifying psychotherapy, supportive  psychotherapy, interactive psychotherapy    Risk parameters:  Patient reports no suicidal ideation  Patient reports no homicidal ideation  Patient reports no self-injurious behavior  Patient reports no violent behavior    Verbal deficits: pt reports hx of speech issues, struggles with getting right words out or slowly processing. Pt soft spoken as well.     Patient's response to intervention:  The patient's response to intervention is accepting.    Progress toward goals and other mental status changes:  The patient's progress toward goals is fair .    Diagnosis:     ICD-10-CM ICD-9-CM   1. MARIEL (generalized anxiety disorder)  F41.1 300.02     Plan:individual psychotherapy and medication management by physician. Pt under the care of Dr. Bearden.   Treatment Goals:  Specify outcomes written in observable, behavioral terms:   Anxiety: reducing negative automatic thoughts, reducing physical symptoms of anxiety, and reducing time spent worrying (<30 minutes/day)  Depression: acquiring relapse prevention skills and reducing negative automatic thoughts     Treatment Plan/Recommendations:   Medication Management: Follow up with Dr. Bearden.  The treatment plan and follow up plan were reviewed with the patient.  [phone]-JNTJ is the suicide prevention hotline.  If there are thoughts of death, dying, or suicide please call the hotline and go to the ER for evaluation.     Return to Clinic: 1 week, earlier if needed. Pt to go to ED or call 911 if symptoms worsen or if she has thoughts of harming self and /or others. Pt verbalized understanding. Length of Service (minutes): 45 min

## 2025-05-05 DIAGNOSIS — F90.0 ADHD (ATTENTION DEFICIT HYPERACTIVITY DISORDER), INATTENTIVE TYPE: ICD-10-CM

## 2025-05-05 RX ORDER — LISDEXAMFETAMINE DIMESYLATE 30 MG/1
30 CAPSULE ORAL EVERY MORNING
Qty: 30 CAPSULE | Refills: 0 | Status: SHIPPED | OUTPATIENT
Start: 2025-05-05

## 2025-05-07 ENCOUNTER — OFFICE VISIT (OUTPATIENT)
Dept: PSYCHIATRY | Facility: CLINIC | Age: 20
End: 2025-05-07
Payer: OTHER GOVERNMENT

## 2025-05-07 DIAGNOSIS — F41.1 GAD (GENERALIZED ANXIETY DISORDER): Primary | ICD-10-CM

## 2025-05-07 DIAGNOSIS — F90.0 ADHD (ATTENTION DEFICIT HYPERACTIVITY DISORDER), INATTENTIVE TYPE: ICD-10-CM

## 2025-05-07 DIAGNOSIS — F43.29 STRESS AND ADJUSTMENT REACTION: ICD-10-CM

## 2025-05-07 DIAGNOSIS — F33.0 MILD EPISODE OF RECURRENT MAJOR DEPRESSIVE DISORDER: ICD-10-CM

## 2025-05-07 PROCEDURE — 90785 PSYTX COMPLEX INTERACTIVE: CPT | Mod: ,,, | Performed by: SOCIAL WORKER

## 2025-05-07 PROCEDURE — 99999 PR PBB SHADOW E&M-EST. PATIENT-LVL II: CPT | Mod: PBBFAC,,, | Performed by: SOCIAL WORKER

## 2025-05-07 PROCEDURE — 99212 OFFICE O/P EST SF 10 MIN: CPT | Mod: PBBFAC,PO | Performed by: SOCIAL WORKER

## 2025-05-07 PROCEDURE — 90834 PSYTX W PT 45 MINUTES: CPT | Mod: ,,, | Performed by: SOCIAL WORKER

## 2025-05-07 RX ORDER — ESCITALOPRAM OXALATE 20 MG/1
20 TABLET ORAL DAILY
Qty: 90 TABLET | Refills: 1 | Status: SHIPPED | OUTPATIENT
Start: 2025-05-07 | End: 2026-05-07

## 2025-05-07 NOTE — PROGRESS NOTES
Individual Psychotherapy (PhD/LCSW)    5/7/2025    Site:  [facility]         Therapeutic Intervention: Met with patient.  Outpatient - Insight oriented psychotherapy 60 min - CPT code 60154, Outpatient - Behavior modifying psychotherapy 60 min - CPT code 17624, Outpatient - Supportive psychotherapy 60 min - CPT Code 40818, Outpatient - Interactive psychotherapy 60 min - CPT code 10009, and Outpatient - Interactive complexity - CPT code 88800  INTERACTIVE COMPLEXITY:  Expressive communication skills to adequately to explain symptoms and response to treatment are impacted by emotional state, requiring the use of interactive methods and materials to elicit data.      Chief complaint/reason for encounter: depression, anxiety, and school / academic, social, family stress     Interval history and content of current session: Pt casually dressed, appears down, reports feeling 6/10 moderately depressed, down, has low energy, low interest, low motivation.  Also anxious regarding school finals and completing work to turn in. Pt also picking her skin more and more.  Feels overwhelmed with many tasks she needs to do re meds, appts. Problem solved and helped pt create list of to do's then prioritize . Was able to get pt Lexapro clarified , as she is taking 20 mg since Feb. Also able to get sooner appt with Dr. Bearden, June 4. Pt agrees to do priority task today of calling pharmacy about her Dupixent as this helps the most re her eczema.   If pharmacy does not help re med being covered as it has been, she will call insurance and can ask parent to help if needed. Next - we focused on triggers, finals and pt has talked with professors, received study guides and asked questions for clarification to prepare. Pt is aware of negative self talk and says she disappoints herself. Problem solved re studying skills and preparing for her finals and use of positive self talk and neutral talk. Focused on pt strengths, symptom  "recognition, CBT, f/up with prescriber. Pt denies any current SI/HI. Denies any current A/VH.        Need to explore pt info re pt stating always had hard time spelling and when reading and writing, often still gets her lowercase d's and b's flip flopped or "jumbled", then when she tries to say the words out loud she jumbles her words and feels embarrassed.  Will see if she had IEP or any educational testing at next appt.     Treatment plan:  Target symptoms: depression, anxiety , adjustment  Why chosen therapy is appropriate versus another modality: relevant to diagnosis, patient responds to this modality, evidence based practice  Outcome monitoring methods: self-report, observation  Therapeutic intervention type: insight oriented psychotherapy, behavior modifying psychotherapy, supportive psychotherapy, interactive psychotherapy    Risk parameters:  Patient reports no suicidal ideation  Patient reports no homicidal ideation  Patient reports no self-injurious behavior  Patient reports no violent behavior    Verbal deficits: pt reports hx of speech issues, struggles with getting right words out or slowly processing. Pt soft spoken as well.     Patient's response to intervention:  The patient's response to intervention is accepting.    Progress toward goals and other mental status changes:  The patient's progress toward goals is slowly progressing.    Diagnosis:     ICD-10-CM ICD-9-CM   1. MARIEL (generalized anxiety disorder)  F41.1 300.02   2. Mild episode of recurrent major depressive disorder  F33.0 296.31   3. ADHD (attention deficit hyperactivity disorder), inattentive type  F90.0 314.00   4. Stress and adjustment reaction  F43.29 309.89     Plan:individual psychotherapy and medication management by physician. Pt under the care of Dr. Bearden.   Treatment Goals:  Specify outcomes written in observable, behavioral terms:   Anxiety: reducing negative automatic thoughts, reducing physical symptoms of anxiety, and " reducing time spent worrying (<30 minutes/day)  Depression: acquiring relapse prevention skills and reducing negative automatic thoughts     Treatment Plan/Recommendations:   Medication Management: Follow up with Dr. Bearden.  The treatment plan and follow up plan were reviewed with the patient.  8-460-443-WZVJ is the suicide prevention hotline.  If there are thoughts of death, dying, or suicide please call the hotline and go to the ER for evaluation.     Return to Clinic: 1 week, earlier if needed. Pt to go to ED or call 911 if symptoms worsen or if she has thoughts of harming self and /or others. Pt verbalized understanding.   Length of Service (minutes): 56 min

## 2025-05-19 ENCOUNTER — OFFICE VISIT (OUTPATIENT)
Dept: PSYCHIATRY | Facility: CLINIC | Age: 20
End: 2025-05-19
Payer: OTHER GOVERNMENT

## 2025-05-19 DIAGNOSIS — F41.1 GAD (GENERALIZED ANXIETY DISORDER): Primary | ICD-10-CM

## 2025-05-19 DIAGNOSIS — F43.29 STRESS AND ADJUSTMENT REACTION: ICD-10-CM

## 2025-05-19 DIAGNOSIS — F33.1 MDD (MAJOR DEPRESSIVE DISORDER), RECURRENT EPISODE, MODERATE: ICD-10-CM

## 2025-05-19 DIAGNOSIS — F42.4 EXCORIATION (SKIN-PICKING) DISORDER: ICD-10-CM

## 2025-05-19 DIAGNOSIS — F90.0 ADHD (ATTENTION DEFICIT HYPERACTIVITY DISORDER), INATTENTIVE TYPE: ICD-10-CM

## 2025-05-19 PROCEDURE — 90837 PSYTX W PT 60 MINUTES: CPT | Mod: ,,, | Performed by: SOCIAL WORKER

## 2025-05-19 PROCEDURE — 99999 PR PBB SHADOW E&M-EST. PATIENT-LVL II: CPT | Mod: PBBFAC,,, | Performed by: SOCIAL WORKER

## 2025-05-19 PROCEDURE — 99212 OFFICE O/P EST SF 10 MIN: CPT | Mod: PBBFAC,PO | Performed by: SOCIAL WORKER

## 2025-05-19 PROCEDURE — 90785 PSYTX COMPLEX INTERACTIVE: CPT | Mod: ,,, | Performed by: SOCIAL WORKER

## 2025-05-22 NOTE — PROGRESS NOTES
Individual Psychotherapy (PhD/LCSW)    5/19/2025    Site:  [facility]         Therapeutic Intervention: Met with patient.  Outpatient - Insight oriented psychotherapy 60 min - CPT code 66790, Outpatient - Behavior modifying psychotherapy 60 min - CPT code 38948, Outpatient - Supportive psychotherapy 60 min - CPT Code 87801, Outpatient - Interactive psychotherapy 60 min - CPT code 21611, and Outpatient - Interactive complexity - CPT code 17466  INTERACTIVE COMPLEXITY:  Expressive communication skills to adequately to explain symptoms and response to treatment are impacted by emotional state, requiring the use of interactive methods and materials to elicit data.      Chief complaint/reason for encounter: depression, anxiety, and school / academic, social, family stress     Interval history and content of current session: Pt casually dressed, appears flat today, reports she completed the 3 tasks we wrote down last week and got her medication from specialty pharmacy and is less itchy. Praised her for this. She reports she has an interview at [employer] and is nervous. She has been feeling down and anxious about her college grades , but her parents were very supportive and told her they are fine with her final grades / retaking classes as needed. Pt was feeling down on herself, reports she loves learning kinesiology, but it has been difficult to complete and turn in assignments and struggles with the tests - memorizing hundreds of definitions and spelling are difficult for her and causes her to not do well on tests.   Depression - 5/10 moderate  Explored interests and problem solving. Pt has tried tutoring center at [facility] and they have not had any volunteers to help with her subjects. Pt reports she is a hands on learner and loves the mechanics of the body and how it works. 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 Reports it takes a while for her to process and understanding test questions and then pick an answer.  She is usually last to finish tests. She had a school professional tell her to do the questions she knows first because she would get stuck on a question early in testing and no complete the entire test, so grade would be extremely low. She gets overwhelmed and anxious when taking tests in class, as everyone is getting up and turning in their tests, while she has answered only a few questions.     Discussed academic and developmental history and pt remembers some accommodations for leap testing related to narcolepsy dx.  Also had speech, but does not remember any other academic accommodations or an IEP. Urged pt to ask parents if the have or remember any IEP's or developmental / learning assessments and if so, bring them in next appt so provider can review. Pt reports she does have her cumulative folder from 12th grade and agrees to bring in with her.     Explored academic struggles and pt has very difficult time with spelling, loves mechanical stuff. Is possible interested in prosthetics, Pt tech or PT, OT, OT tech maybe. Pt having to retake a few classes but she passed her 2 labs and her Health class. Explored credits re obtaining her AA, especially if she only has a few credits to complete the AA, and then focus on specific major and can change major as well. Pt reports she will look into how many credits she has.  Pt reports older sister has been in better mood so it has been more calm at the house . Pt denies any current SI/HI. Denies any current A/VH.        From last appt on 5/7/25 - reports feeling 6/10 moderately depressed, down, has low energy, low interest, low motivation.  Also anxious regarding school finals and completing work to turn in. Pt also picking her skin more and more.  Feels overwhelmed with many tasks she needs to do re meds, appts. Problem solved and helped pt create list of to do's then prioritize . Was able to get pt Lexapro clarified , as she is taking 20 mg since Feb. Also able to get  "sooner appt with Dr. Bearden, June 4. Pt agrees to do priority task today of calling pharmacy about her Dupixent as this helps the most re her eczema.   If pharmacy does not help re med being covered as it has been, she will call insurance and can ask parent to help if needed. Next - we focused on triggers, finals and pt has talked with professors, received study guides and asked questions for clarification to prepare. Pt is aware of negative self talk and says she disappoints herself. Problem solved re studying skills and preparing for her finals and use of positive self talk and neutral talk. Focused on pt strengths, symptom recognition, CBT, f/up with prescriber. Pt denies any current SI/HI. Denies any current A/VH.        Need to explore pt info re pt stating always had hard time spelling and when reading and writing, often still gets her lowercase d's and b's flip flopped or "jumbled", then when she tries to say the words out loud she jumbles her words and feels embarrassed.  Will see if she had IEP or any educational testing at next appt.     Treatment plan:  Target symptoms: depression, anxiety , adjustment  Why chosen therapy is appropriate versus another modality: relevant to diagnosis, patient responds to this modality, evidence based practice  Outcome monitoring methods: self-report, observation  Therapeutic intervention type: insight oriented psychotherapy, behavior modifying psychotherapy, supportive psychotherapy, interactive psychotherapy    Risk parameters:  Patient reports no suicidal ideation  Patient reports no homicidal ideation  Patient reports no self-injurious behavior  Patient reports no violent behavior    Verbal deficits: pt reports hx of speech issues, struggles with getting right words out or slowly processing. Pt soft spoken as well.     Patient's response to intervention:  The patient's response to intervention is accepting.    Progress toward goals and other mental status changes:  The " patient's progress toward goals is slowly progressing.    Diagnosis:     ICD-10-CM ICD-9-CM   1. MARIEL (generalized anxiety disorder)  F41.1 300.02   2. MDD (major depressive disorder), recurrent episode, moderate  F33.1 296.32   3. ADHD (attention deficit hyperactivity disorder), inattentive type  F90.0 314.00   4. Stress and adjustment reaction  F43.29 309.89   5. Excoriation (skin-picking) disorder  F42.4 698.4     Plan:individual psychotherapy and medication management by physician. Pt under the care of Dr. Bearden.   Treatment Goals:  Specify outcomes written in observable, behavioral terms:   Anxiety: reducing negative automatic thoughts, reducing physical symptoms of anxiety, and reducing time spent worrying (<30 minutes/day)  Depression: acquiring relapse prevention skills and reducing negative automatic thoughts     Treatment Plan/Recommendations:   Medication Management: Follow up with Dr. Bearden.  The treatment plan and follow up plan were reviewed with the patient.  [phone]-QNWB is the suicide prevention hotline.  If there are thoughts of death, dying, or suicide please call the hotline and go to the ER for evaluation.     Return to Clinic: 1 week, earlier if needed. Pt to go to ED or call 911 if symptoms worsen or if she has thoughts of harming self and /or others. Pt verbalized understanding.   Length of Service (minutes): 58 min

## 2025-06-02 ENCOUNTER — OFFICE VISIT (OUTPATIENT)
Dept: PSYCHIATRY | Facility: CLINIC | Age: 20
End: 2025-06-02
Payer: OTHER GOVERNMENT

## 2025-06-02 DIAGNOSIS — F42.4 EXCORIATION (SKIN-PICKING) DISORDER: ICD-10-CM

## 2025-06-02 DIAGNOSIS — F33.1 MDD (MAJOR DEPRESSIVE DISORDER), RECURRENT EPISODE, MODERATE: Primary | ICD-10-CM

## 2025-06-02 DIAGNOSIS — F41.1 GAD (GENERALIZED ANXIETY DISORDER): ICD-10-CM

## 2025-06-02 DIAGNOSIS — F90.0 ADHD (ATTENTION DEFICIT HYPERACTIVITY DISORDER), INATTENTIVE TYPE: ICD-10-CM

## 2025-06-02 PROCEDURE — 99999 PR PBB SHADOW E&M-EST. PATIENT-LVL II: CPT | Mod: PBBFAC,,, | Performed by: SOCIAL WORKER

## 2025-06-02 PROCEDURE — 90785 PSYTX COMPLEX INTERACTIVE: CPT | Mod: ,,, | Performed by: SOCIAL WORKER

## 2025-06-02 PROCEDURE — 99212 OFFICE O/P EST SF 10 MIN: CPT | Mod: PBBFAC,PO | Performed by: SOCIAL WORKER

## 2025-06-02 PROCEDURE — 90837 PSYTX W PT 60 MINUTES: CPT | Mod: ,,, | Performed by: SOCIAL WORKER

## 2025-06-02 NOTE — PROGRESS NOTES
"Individual Psychotherapy (PhD/LCSW)    6/2/2025    Site:  [facility] [place], operated by [facility]         Therapeutic Intervention: Met with patient.  Outpatient - Insight oriented psychotherapy 60 min - CPT code 16720, Outpatient - Behavior modifying psychotherapy 60 min - CPT code 47500, Outpatient - Supportive psychotherapy 60 min - CPT Code 87138, Outpatient - Interactive psychotherapy 60 min - CPT code 81915, and Outpatient - Interactive complexity - CPT code 78950  INTERACTIVE COMPLEXITY:  Expressive communication skills to adequately to explain symptoms and response to treatment are impacted by emotional state, requiring the use of interactive methods and materials to elicit data.      Chief complaint/reason for encounter: depression, anxiety, and school / academic, social, family stress     Interval history and content of current session: Pt casually dressed, appears flat today, reports mood has been "ok. Iffy" Low motivation, low interest. Low energy.  Older sister behavior has calmed down over past 2 weeks so less arguing and less sensory overload at home. Pt has applied for many jobs, not getting calls back. Explored this and problem solved with pt. Parents are supportive in that they are not pushy about pt getting a job right now. Pt brought copy of [facility] appt with Dr. Otto and reports she has no other developmental or education testing papers. Pt reports feeling invisible, as little sister has obvious language issues, is very childlike, but pt was shy, quiet and she talked to Dr. Otto about this and pt reports they were trying to set up autism testing, but was long wait list. Asked pt to tell me more about this. Pt states her friends tell her she is on the spectrum, and can see her get overwhelmed with too much noise, too many people.  Pt reports she and sister can  not stand certain textures of clothes since they were little. Pt gets upset when mom and older sister push her to wear feminine clothing as pt prefers " soft, baggier tshirts, not form fitting clothes that older sister tends to wear.  Pt reports being bullied at school for years, especially due to speech. Was very shy.       Pt reports anxiety about timed tests as it takes her longer to process and think of the answers. Also states she was in speech for years due to struggling to find the right words and get the right words out.  She is feeling less down re college grades as parents have told her to keep going and trying and they are fine with it. Pt gets stipend from  while pt in school. has been feeling down and anxious about her college grades , but her parents were very supportive and told her they are fine with her final grades / retaking classes as needed. Pt was feeling down on herself, reports she loves learning kinesiology, but it has been difficult to complete and turn in assignments and struggles with the tests - memorizing hundreds of definitions and spelling are difficult for her and causes her to not do well on tests.     Depression - 5-6/10 moderate    Discussed pt upcoming college schedule and potential resources. Also explored interests and hobbies. Pt very good with mechanics of things and learning by hands on activities. Pt has tried tutoring center at [facility] and they have not had any volunteers to help with her subjects. Pt reports she is a hands on learner and loves the mechanics of the body and how it works. 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 Reports it takes a while for her to process and understanding test questions and then pick an answer. She is usually last to finish tests. She had a school professional tell her to do the questions she knows first because she would get stuck on a question early in testing and no complete the entire test, so grade would be extremely low. She gets overwhelmed and anxious when taking tests in class, as everyone is getting up and turning in their tests, while she has answered only a few questions.      Discussed academic and developmental history and pt remembers some accommodations for leap testing related to narcolepsy dx.  Also had speech, but does not remember any other academic accommodations or an IEP. Urged pt to ask parents if the have or remember any IEP's or developmental / learning assessments and if so, bring them in next appt so provider can review. Pt reports she does have her cumulative folder from 12th grade and agrees to bring in with her.     Explored academic struggles and pt has very difficult time with spelling, loves mechanical stuff. Is possible interested in prosthetics, Pt tech or PT, OT, OT tech maybe. Pt having to retake a few classes but she passed her 2 labs and her Health class. Explored credits re obtaining her AA, especially if she only has a few credits to complete the AA, and then focus on specific major and can change major as well. Pt reports she will look into how many credits she has.  Pt reports older sister has been in better mood so it has been more calm at the house . Pt denies any current SI/HI. Denies any current A/VH.  Pt is under the care of Dr. Bearden, [facility],  scheduled to see him this week.          Treatment plan:  Target symptoms: depression, anxiety , adjustment, academic and family dynamics stress  Why chosen therapy is appropriate versus another modality: relevant to diagnosis, patient responds to this modality, evidence based practice  Outcome monitoring methods: self-report, observation  Therapeutic intervention type: insight oriented psychotherapy, behavior modifying psychotherapy, supportive psychotherapy, interactive psychotherapy    Risk parameters:  Patient reports no suicidal ideation  Patient reports no homicidal ideation  Patient reports no self-injurious behavior  Patient reports no violent behavior    Verbal deficits: pt reports hx of speech issues, struggles with getting right words out or slowly processing. Pt soft spoken as well.     Patient's  response to intervention:  The patient's response to intervention is accepting.    Progress toward goals and other mental status changes:  The patient's progress toward goals is slowly progressing.    Diagnosis:     ICD-10-CM ICD-9-CM   1. MDD (major depressive disorder), recurrent episode, moderate  F33.1 296.32   2. ADHD (attention deficit hyperactivity disorder), inattentive type  F90.0 314.00   3. Excoriation (skin-picking) disorder  F42.4 698.4   4. MARIEL (generalized anxiety disorder)  F41.1 300.02     Plan:individual psychotherapy and medication management by physician. Pt under the care of Dr. Bearden.   Treatment Goals:  Specify outcomes written in observable, behavioral terms:   Anxiety: reducing negative automatic thoughts, reducing physical symptoms of anxiety, and reducing time spent worrying (<30 minutes/day)  Depression: acquiring relapse prevention skills and reducing negative automatic thoughts     Treatment Plan/Recommendations:   Medication Management: Follow up with Dr. Bearden.  The treatment plan and follow up plan were reviewed with the patient.  3-522-375-HVHJ is the suicide prevention hotline.  If there are thoughts of death, dying, or suicide please call the hotline and go to the ER for evaluation.     Return to Clinic: 1 week, earlier if needed. Pt to go to ED or call 911 if symptoms worsen or if she has thoughts of harming self and /or others. Pt verbalized understanding.   Length of Service (minutes): 70 min

## 2025-06-04 ENCOUNTER — OFFICE VISIT (OUTPATIENT)
Dept: PSYCHIATRY | Facility: CLINIC | Age: 20
End: 2025-06-04
Payer: OTHER GOVERNMENT

## 2025-06-04 VITALS
WEIGHT: 173.63 LBS | BODY MASS INDEX: 32.78 KG/M2 | DIASTOLIC BLOOD PRESSURE: 76 MMHG | SYSTOLIC BLOOD PRESSURE: 114 MMHG | HEIGHT: 61 IN | HEART RATE: 99 BPM

## 2025-06-04 DIAGNOSIS — F90.0 ADHD (ATTENTION DEFICIT HYPERACTIVITY DISORDER), INATTENTIVE TYPE: Primary | ICD-10-CM

## 2025-06-04 DIAGNOSIS — F33.0 MILD EPISODE OF RECURRENT MAJOR DEPRESSIVE DISORDER: ICD-10-CM

## 2025-06-04 PROCEDURE — 99214 OFFICE O/P EST MOD 30 MIN: CPT | Mod: PBBFAC,PO | Performed by: PSYCHOLOGIST

## 2025-06-04 PROCEDURE — 99999 PR PBB SHADOW E&M-EST. PATIENT-LVL IV: CPT | Mod: PBBFAC,,, | Performed by: PSYCHOLOGIST

## 2025-06-04 PROCEDURE — G2211 COMPLEX E/M VISIT ADD ON: HCPCS | Mod: S$PBB,,, | Performed by: PSYCHOLOGIST

## 2025-06-04 PROCEDURE — 99214 OFFICE O/P EST MOD 30 MIN: CPT | Mod: S$PBB,,, | Performed by: PSYCHOLOGIST

## 2025-06-04 RX ORDER — DESOGESTREL AND ETHINYL ESTRADIOL AND ETHINYL ESTRADIOL 21-5 (28)
1 KIT ORAL
COMMUNITY
Start: 2025-05-08

## 2025-06-04 RX ORDER — LISDEXAMFETAMINE DIMESYLATE 30 MG/1
30 CAPSULE ORAL EVERY MORNING
Qty: 30 CAPSULE | Refills: 0 | Status: SHIPPED | OUTPATIENT
Start: 2025-06-04

## 2025-06-04 RX ORDER — SERTRALINE HYDROCHLORIDE 50 MG/1
TABLET, FILM COATED ORAL
Qty: 60 TABLET | Refills: 1 | Status: SHIPPED | OUTPATIENT
Start: 2025-06-04

## 2025-06-04 RX ORDER — DUPILUMAB 300 MG/2ML
INJECTION, SOLUTION SUBCUTANEOUS
COMMUNITY
Start: 2025-05-22

## 2025-06-10 ENCOUNTER — PATIENT MESSAGE (OUTPATIENT)
Dept: PSYCHIATRY | Facility: CLINIC | Age: 20
End: 2025-06-10
Payer: OTHER GOVERNMENT

## 2025-06-11 ENCOUNTER — OFFICE VISIT (OUTPATIENT)
Dept: PSYCHIATRY | Facility: CLINIC | Age: 20
End: 2025-06-11
Payer: OTHER GOVERNMENT

## 2025-06-11 DIAGNOSIS — F90.0 ADHD (ATTENTION DEFICIT HYPERACTIVITY DISORDER), INATTENTIVE TYPE: ICD-10-CM

## 2025-06-11 DIAGNOSIS — F42.4 EXCORIATION (SKIN-PICKING) DISORDER: ICD-10-CM

## 2025-06-11 DIAGNOSIS — F41.1 GAD (GENERALIZED ANXIETY DISORDER): ICD-10-CM

## 2025-06-11 DIAGNOSIS — F33.1 MDD (MAJOR DEPRESSIVE DISORDER), RECURRENT EPISODE, MODERATE: Primary | ICD-10-CM

## 2025-06-11 PROCEDURE — 99999 PR PBB SHADOW E&M-EST. PATIENT-LVL I: CPT | Mod: PBBFAC,,, | Performed by: SOCIAL WORKER

## 2025-06-11 PROCEDURE — 90785 PSYTX COMPLEX INTERACTIVE: CPT | Mod: ,,, | Performed by: SOCIAL WORKER

## 2025-06-11 PROCEDURE — 90837 PSYTX W PT 60 MINUTES: CPT | Mod: ,,, | Performed by: SOCIAL WORKER

## 2025-06-11 PROCEDURE — 99211 OFF/OP EST MAY X REQ PHY/QHP: CPT | Mod: PBBFAC,PO | Performed by: SOCIAL WORKER

## 2025-06-13 ENCOUNTER — PATIENT MESSAGE (OUTPATIENT)
Dept: PSYCHIATRY | Facility: CLINIC | Age: 20
End: 2025-06-13
Payer: OTHER GOVERNMENT

## 2025-06-16 ENCOUNTER — OFFICE VISIT (OUTPATIENT)
Dept: PSYCHIATRY | Facility: CLINIC | Age: 20
End: 2025-06-16
Payer: OTHER GOVERNMENT

## 2025-06-16 DIAGNOSIS — F90.0 ADHD (ATTENTION DEFICIT HYPERACTIVITY DISORDER), INATTENTIVE TYPE: ICD-10-CM

## 2025-06-16 DIAGNOSIS — F43.29 STRESS AND ADJUSTMENT REACTION: ICD-10-CM

## 2025-06-16 DIAGNOSIS — F42.4 EXCORIATION (SKIN-PICKING) DISORDER: ICD-10-CM

## 2025-06-16 DIAGNOSIS — F33.1 MDD (MAJOR DEPRESSIVE DISORDER), RECURRENT EPISODE, MODERATE: Primary | ICD-10-CM

## 2025-06-16 DIAGNOSIS — F41.1 GAD (GENERALIZED ANXIETY DISORDER): ICD-10-CM

## 2025-06-16 PROCEDURE — 99999 PR PBB SHADOW E&M-EST. PATIENT-LVL I: CPT | Mod: PBBFAC,,, | Performed by: SOCIAL WORKER

## 2025-06-16 PROCEDURE — 99211 OFF/OP EST MAY X REQ PHY/QHP: CPT | Mod: PBBFAC,PO | Performed by: SOCIAL WORKER

## 2025-06-16 PROCEDURE — 90785 PSYTX COMPLEX INTERACTIVE: CPT | Mod: ,,, | Performed by: SOCIAL WORKER

## 2025-06-16 PROCEDURE — 90837 PSYTX W PT 60 MINUTES: CPT | Mod: ,,, | Performed by: SOCIAL WORKER

## 2025-06-18 NOTE — PROGRESS NOTES
"Individual Psychotherapy (PhD/LCSW)    6/11/2025    [facility] [place]         Therapeutic Intervention: Met with patient.  Outpatient - Insight oriented psychotherapy 60 min - CPT code 06719, Outpatient - Behavior modifying psychotherapy 60 min - CPT code 97408, Outpatient - Supportive psychotherapy 60 min - CPT Code 61332, Outpatient - Interactive psychotherapy 60 min - CPT code 96912, and Outpatient - Interactive complexity - CPT code 87969  INTERACTIVE COMPLEXITY:  Expressive communication skills to adequately to explain symptoms and response to treatment are impacted by emotional state, requiring the use of interactive methods and materials to elicit data.      Chief complaint/reason for encounter: depression, anxiety, and school / academic, social, family stress     Interval history and content of current session: Pt casually dressed, appears flat today, reports mood has been "low motivation. I haven't found a job and my mom says don't worry but I do." Reports feeling guilty that she wants some positive interaction, attention from her mom, but  mom focuses on older sister due to older sister's mood and behavior swings /shifts. Pt reports feeling anxious, feels fast heart rate, overwhelmed easily and hyperventilates when she worries or gets upset at home. Pt reports overthinking, will feel nauseous when nervous.  Parents do try to help pt feel more calm. Pt goal for future is to move out and wants parents to help her learn more about this vs her older sister that is middle aged and has been on her own before. Reports feeling "really scared. Really nervous with the criticism of my older sister". Provided support, validation and CBT- self talk, auto thoughts, how to cope and assertive communication / scripts to help. Offered family session in future as well. Pt denies any current SI/HI. Denies any current A/VH.     Pt is under the care of Dr. Bearden, [facility],  saw him this week and going through med change. "   Current Outpatient Medications on File Prior to Visit   Medication Sig Dispense Refill    betamethasone dipropionate 0.05 % cream Apply topically 2 (two) times daily. Use sparingly on severe eczema 45 g 0    clobetasol 0.05% (TEMOVATE) 0.05 % Oint Apply topically.      DUPIXENT  mg/2 mL PnIj       famotidine (PEPCID) 20 MG tablet Take 1 tablet (20 mg total) by mouth once daily. 30 tablet 2    hydrOXYzine HCL (ATARAX) 25 MG tablet Take 1 tablet (25 mg total) by mouth 3 (three) times daily. 30 tablet 0    loratadine (CLARITIN) 10 mg tablet Take 1 tablet (10 mg total) by mouth once daily. 90 tablet 3    sertraline (ZOLOFT) 50 MG tablet Take 0.5 tab (25mg) Q HS for four nights, then take 1 tab (50mg) Q HS for three nights, then take 2 tabs (100mg) Q HS 60 tablet 1    tacrolimus (PROTOPIC) 0.1 % ointment Apply topically 2 (two) times daily. 60 g 0    triamcinolone acetonide 0.1% (KENALOG) 0.1 % cream Apply topically 2 (two) times daily. 45 g 2    VIORELE, 28, 0.15-0.02 mgx21 /0.01 mg x 5 per tablet Take 1 tablet by mouth.      VYVANSE 30 mg capsule Take 1 capsule (30 mg total) by mouth every morning. 30 capsule 0     No current facility-administered medications on file prior to visit.      Treatment plan:  Target symptoms: depression, anxiety , adjustment, academic and family dynamics stress, social,   Why chosen therapy is appropriate versus another modality: relevant to diagnosis, patient responds to this modality, evidence based practice  Outcome monitoring methods: self-report, observation  Therapeutic intervention type: insight oriented psychotherapy, behavior modifying psychotherapy, supportive psychotherapy, interactive psychotherapy    Risk parameters:  Patient reports no suicidal ideation  Patient reports no homicidal ideation  Patient reports no self-injurious behavior  Patient reports no violent behavior    Verbal deficits: pt reports hx of speech issues, struggles with getting right words out or  slowly processing. Pt soft spoken as well.     Patient's response to intervention:  The patient's response to intervention is accepting.    Progress toward goals and other mental status changes:  The patient's progress toward goals is positive progress.    Diagnosis:     ICD-10-CM ICD-9-CM   1. MDD (major depressive disorder), recurrent episode, moderate  F33.1 296.32   2. Excoriation (skin-picking) disorder  F42.4 698.4   3. MARIEL (generalized anxiety disorder)  F41.1 300.02   4. ADHD (attention deficit hyperactivity disorder), inattentive type  F90.0 314.00     Plan:individual psychotherapy and medication management by physician. Pt under the care of Dr. Bearden.   Treatment Goals:  Specify outcomes written in observable, behavioral terms:   Anxiety: reducing negative automatic thoughts, reducing physical symptoms of anxiety, and reducing time spent worrying (<30 minutes/day)  Depression: acquiring relapse prevention skills and reducing negative automatic thoughts     Treatment Plan/Recommendations:   Medication Management: Follow up with Dr. Bearden.  The treatment plan and follow up plan were reviewed with the patient.  3-727-794-PWVN is the suicide prevention hotline.  If there are thoughts of death, dying, or suicide please call the hotline and go to the ER for evaluation.     Return to Clinic: 1 week, earlier if needed. Pt to go to ED or call 911 if symptoms worsen or if she has thoughts of harming self and /or others. Pt verbalized understanding.   Length of Service (minutes): 60 min

## 2025-06-19 NOTE — PROGRESS NOTES
"Individual Psychotherapy (PhD/LCSW)    6/16/2025    Site:  [facility]         Therapeutic Intervention: Met with patient.  Outpatient - Insight oriented psychotherapy 60 min - CPT code 64748, Outpatient - Behavior modifying psychotherapy 60 min - CPT code 23667, Outpatient - Supportive psychotherapy 60 min - CPT Code 72523, Outpatient - Interactive psychotherapy 60 min - CPT code 20414, and Outpatient - Interactive complexity - CPT code 84026  INTERACTIVE COMPLEXITY:  Expressive communication skills to adequately to explain symptoms and response to treatment are impacted by emotional state, requiring the use of interactive methods and materials to elicit data.      Chief complaint/reason for encounter: depression, anxiety, and school / academic, social, family stress     Interval history and content of current session: Pt casually dressed, appears flat today, reports mood has been "a little Iffy, a little better. My mom's been telling me to get out with my friends, but I worry about the finances." Pt reports "feeling less paranoid about what people think. My depression is still bad but not as bad."  Pt worried about school and how hard it is for her, wants hands on, more mechanical type of learning and career. Has not talked to parents about this due to fearing criticism. Provided support, validation and CBT- self talk, auto thoughts, how to cope and assertive communication / scripts to help. Offered family session in future as well. Pt meeting friends for her birthday and is excited about this. Will hang out and get food together. Has job interview at [employer]. Used CBT to relabel and reframe.  Pt denies any current SI/HI. Denies any current A/VH.     Pt is under the care of Dr. Bearden, [facility],  saw him this week and going through med change.   Current Outpatient Medications on File Prior to Visit   Medication Sig Dispense Refill    betamethasone dipropionate 0.05 % cream Apply topically 2 (two) times " daily. Use sparingly on severe eczema 45 g 0    clobetasol 0.05% (TEMOVATE) 0.05 % Oint Apply topically.      DUPIXENT  mg/2 mL PnIj       famotidine (PEPCID) 20 MG tablet Take 1 tablet (20 mg total) by mouth once daily. 30 tablet 2    hydrOXYzine HCL (ATARAX) 25 MG tablet Take 1 tablet (25 mg total) by mouth 3 (three) times daily. 30 tablet 0    loratadine (CLARITIN) 10 mg tablet Take 1 tablet (10 mg total) by mouth once daily. 90 tablet 3    sertraline (ZOLOFT) 50 MG tablet Take 0.5 tab (25mg) Q HS for four nights, then take 1 tab (50mg) Q HS for three nights, then take 2 tabs (100mg) Q HS 60 tablet 1    tacrolimus (PROTOPIC) 0.1 % ointment Apply topically 2 (two) times daily. 60 g 0    triamcinolone acetonide 0.1% (KENALOG) 0.1 % cream Apply topically 2 (two) times daily. 45 g 2    VIORELE, 28, 0.15-0.02 mgx21 /0.01 mg x 5 per tablet Take 1 tablet by mouth.      VYVANSE 30 mg capsule Take 1 capsule (30 mg total) by mouth every morning. 30 capsule 0     No current facility-administered medications on file prior to visit.      Treatment plan:  Target symptoms: depression, anxiety , adjustment, academic and family dynamics stress, social,   Why chosen therapy is appropriate versus another modality: relevant to diagnosis, patient responds to this modality, evidence based practice  Outcome monitoring methods: self-report, observation  Therapeutic intervention type: insight oriented psychotherapy, behavior modifying psychotherapy, supportive psychotherapy, interactive psychotherapy    Risk parameters:  Patient reports no suicidal ideation  Patient reports no homicidal ideation  Patient reports no self-injurious behavior  Patient reports no violent behavior    Verbal deficits: pt reports hx of speech issues, struggles with getting right words out or slowly processing. Pt soft spoken as well.     Patient's response to intervention:  The patient's response to intervention is accepting.    Progress toward goals and  other mental status changes:  The patient's progress toward goals is positive progress.    Diagnosis:     ICD-10-CM ICD-9-CM   1. MDD (major depressive disorder), recurrent episode, moderate  F33.1 296.32   2. MARIEL (generalized anxiety disorder)  F41.1 300.02   3. ADHD (attention deficit hyperactivity disorder), inattentive type  F90.0 314.00   4. Excoriation (skin-picking) disorder  F42.4 698.4   5. Stress and adjustment reaction  F43.29 309.89     Plan:individual psychotherapy and medication management by physician. Pt under the care of Dr. Bearden.   Treatment Goals:  Specify outcomes written in observable, behavioral terms:   Anxiety: reducing negative automatic thoughts, reducing physical symptoms of anxiety, and reducing time spent worrying (<30 minutes/day)  Depression: acquiring relapse prevention skills and reducing negative automatic thoughts     Treatment Plan/Recommendations:   Medication Management: Follow up with Dr. Bearden.  The treatment plan and follow up plan were reviewed with the patient.  5-013-173-ITSO is the suicide prevention hotline.  If there are thoughts of death, dying, or suicide please call the hotline and go to the ER for evaluation.     Return to Clinic: 1 week, earlier if needed. Pt to go to ED or call 911 if symptoms worsen or if she has thoughts of harming self and /or others. Pt verbalized understanding.   Length of Service (minutes): 60 min

## 2025-06-20 ENCOUNTER — PATIENT MESSAGE (OUTPATIENT)
Dept: PSYCHIATRY | Facility: CLINIC | Age: 20
End: 2025-06-20
Payer: OTHER GOVERNMENT

## 2025-06-23 ENCOUNTER — OFFICE VISIT (OUTPATIENT)
Dept: PSYCHIATRY | Facility: CLINIC | Age: 20
End: 2025-06-23
Payer: OTHER GOVERNMENT

## 2025-06-23 DIAGNOSIS — F90.0 ADHD (ATTENTION DEFICIT HYPERACTIVITY DISORDER), INATTENTIVE TYPE: ICD-10-CM

## 2025-06-23 DIAGNOSIS — F41.1 GAD (GENERALIZED ANXIETY DISORDER): ICD-10-CM

## 2025-06-23 DIAGNOSIS — F33.1 MDD (MAJOR DEPRESSIVE DISORDER), RECURRENT EPISODE, MODERATE: Primary | ICD-10-CM

## 2025-06-23 PROCEDURE — 90785 PSYTX COMPLEX INTERACTIVE: CPT | Mod: ,,, | Performed by: SOCIAL WORKER

## 2025-06-23 PROCEDURE — 99212 OFFICE O/P EST SF 10 MIN: CPT | Mod: PBBFAC,PO | Performed by: SOCIAL WORKER

## 2025-06-23 PROCEDURE — 99999 PR PBB SHADOW E&M-EST. PATIENT-LVL II: CPT | Mod: PBBFAC,,, | Performed by: SOCIAL WORKER

## 2025-06-23 PROCEDURE — 90834 PSYTX W PT 45 MINUTES: CPT | Mod: ,,, | Performed by: SOCIAL WORKER

## 2025-06-30 NOTE — PROGRESS NOTES
"Individual Psychotherapy (PhD/LCSW)    6/23/2025    Site:  [facility]         Therapeutic Intervention: Met with patient.  Outpatient - Insight oriented psychotherapy 60 min - CPT code 74475, Outpatient - Behavior modifying psychotherapy 60 min - CPT code 77420, Outpatient - Supportive psychotherapy 60 min - CPT Code 19906, Outpatient - Interactive psychotherapy 60 min - CPT code 40290, and Outpatient - Interactive complexity - CPT code 56848  INTERACTIVE COMPLEXITY:  Expressive communication skills to adequately to explain symptoms and response to treatment are impacted by emotional state, requiring the use of interactive methods and materials to elicit data.      Chief complaint/reason for encounter: depression, anxiety, and school / academic, social, family stress     Interval history and content of current session: Pt casually dressed, reports mood has been "a little better", reports anxiety re communicating pt feelings and goals with parents and wanting parents to help pt become more independent, to teach her, vs focus having to be on her older sister (bio mom in the home) Pt also worried about school and career, likes mechanical hands on work, good with setting up electronics as well.   Provided support, validation and CBT- self talk, looking at best and worst case scenario and likelihood , awareness of auto thoughts, how to cope and assertive communication / scripts to help. Offered family session in future as well. Pt had good time with friends last week . Used CBT to relabel and reframe.  Pt denies any current SI/HI. Denies any current A/VH.     Pt is under the care of Dr. Bearden, [facility],  saw him this week and going through med change.   Current Outpatient Medications on File Prior to Visit   Medication Sig Dispense Refill    betamethasone dipropionate 0.05 % cream Apply topically 2 (two) times daily. Use sparingly on severe eczema 45 g 0    clobetasol 0.05% (TEMOVATE) 0.05 % Oint Apply topically.      " DUPIXENT  mg/2 mL PnIj       famotidine (PEPCID) 20 MG tablet Take 1 tablet (20 mg total) by mouth once daily. 30 tablet 2    hydrOXYzine HCL (ATARAX) 25 MG tablet Take 1 tablet (25 mg total) by mouth 3 (three) times daily. 30 tablet 0    loratadine (CLARITIN) 10 mg tablet Take 1 tablet (10 mg total) by mouth once daily. 90 tablet 3    sertraline (ZOLOFT) 50 MG tablet Take 0.5 tab (25mg) Q HS for four nights, then take 1 tab (50mg) Q HS for three nights, then take 2 tabs (100mg) Q HS 60 tablet 1    tacrolimus (PROTOPIC) 0.1 % ointment Apply topically 2 (two) times daily. 60 g 0    triamcinolone acetonide 0.1% (KENALOG) 0.1 % cream Apply topically 2 (two) times daily. 45 g 2    VIORELE, 28, 0.15-0.02 mgx21 /0.01 mg x 5 per tablet Take 1 tablet by mouth.      VYVANSE 30 mg capsule Take 1 capsule (30 mg total) by mouth every morning. 30 capsule 0     No current facility-administered medications on file prior to visit.      Treatment plan:  Target symptoms: depression, anxiety , adjustment, academic and family dynamics stress, social,   Why chosen therapy is appropriate versus another modality: relevant to diagnosis, patient responds to this modality, evidence based practice  Outcome monitoring methods: self-report, observation  Therapeutic intervention type: insight oriented psychotherapy, behavior modifying psychotherapy, supportive psychotherapy, interactive psychotherapy    Risk parameters:  Patient reports no suicidal ideation  Patient reports no homicidal ideation  Patient reports no self-injurious behavior  Patient reports no violent behavior    Verbal deficits: pt reports hx of speech issues, struggles with getting right words out or slowly processing. Pt soft spoken as well.     Patient's response to intervention:  The patient's response to intervention is accepting.    Progress toward goals and other mental status changes:  The patient's progress toward goals is positive progress.    Diagnosis:      ICD-10-CM ICD-9-CM   1. MDD (major depressive disorder), recurrent episode, moderate  F33.1 296.32   2. MARIEL (generalized anxiety disorder)  F41.1 300.02   3. ADHD (attention deficit hyperactivity disorder), inattentive type  F90.0 314.00     Plan:individual psychotherapy and medication management by physician. Pt under the care of Dr. Bearden.   Treatment Goals:  Specify outcomes written in observable, behavioral terms:   Anxiety: reducing negative automatic thoughts, reducing physical symptoms of anxiety, and reducing time spent worrying (<30 minutes/day)  Depression: acquiring relapse prevention skills and reducing negative automatic thoughts     Treatment Plan/Recommendations:   Medication Management: Follow up with Dr. Bearden.  The treatment plan and follow up plan were reviewed with the patient.  2-533-172-BJQS is the suicide prevention hotline.  If there are thoughts of death, dying, or suicide please call the hotline and go to the ER for evaluation.     Return to Clinic: 1 week, earlier if needed. Pt to go to ED or call 911 if symptoms worsen or if she has thoughts of harming self and /or others. Pt verbalized understanding.   Length of Service (minutes): 60 min

## 2025-07-01 ENCOUNTER — PATIENT MESSAGE (OUTPATIENT)
Dept: PSYCHIATRY | Facility: CLINIC | Age: 20
End: 2025-07-01
Payer: OTHER GOVERNMENT

## 2025-07-02 ENCOUNTER — OFFICE VISIT (OUTPATIENT)
Dept: PSYCHIATRY | Facility: CLINIC | Age: 20
End: 2025-07-02
Payer: OTHER GOVERNMENT

## 2025-07-02 DIAGNOSIS — F90.0 ADHD (ATTENTION DEFICIT HYPERACTIVITY DISORDER), INATTENTIVE TYPE: ICD-10-CM

## 2025-07-02 DIAGNOSIS — F41.1 GAD (GENERALIZED ANXIETY DISORDER): ICD-10-CM

## 2025-07-02 DIAGNOSIS — F33.1 MDD (MAJOR DEPRESSIVE DISORDER), RECURRENT EPISODE, MODERATE: Primary | ICD-10-CM

## 2025-07-02 PROCEDURE — 90785 PSYTX COMPLEX INTERACTIVE: CPT | Mod: ,,, | Performed by: SOCIAL WORKER

## 2025-07-02 PROCEDURE — 99212 OFFICE O/P EST SF 10 MIN: CPT | Mod: PBBFAC,PO | Performed by: SOCIAL WORKER

## 2025-07-02 PROCEDURE — 90837 PSYTX W PT 60 MINUTES: CPT | Mod: ,,, | Performed by: SOCIAL WORKER

## 2025-07-02 PROCEDURE — 99999 PR PBB SHADOW E&M-EST. PATIENT-LVL II: CPT | Mod: PBBFAC,,, | Performed by: SOCIAL WORKER

## 2025-07-02 NOTE — PROGRESS NOTES
Individual Psychotherapy (PhD/LCSW)    7/2/2025    Site:  [facility]         Therapeutic Intervention: Met with patient.  Outpatient - Insight oriented psychotherapy 60 min - CPT code 31956, Outpatient - Behavior modifying psychotherapy 60 min - CPT code 26361, Outpatient - Supportive psychotherapy 60 min - CPT Code 45247, Outpatient - Interactive psychotherapy 60 min - CPT code 26980, and Outpatient - Interactive complexity - CPT code 12244  INTERACTIVE COMPLEXITY:  Expressive communication skills to adequately to explain symptoms and response to treatment are impacted by emotional state, requiring the use of interactive methods and materials to elicit data.      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 Parents have verbalized they are proud of her and she is looking forward to working. Will go there today. Was hired on the spot after the interview. They asked questions and pt told them about her work ethic, morals and values and they were impressed. Provided support and praise for pt as she has been anxious about verbal responses in interviews. Older sister is being negative about the job and pt able to let it roll of her shoulder a but more this week. Reports noticing medicine seems to be helping more with her depression and anxiety. Also headaches stopped.    Provided support, validation and CBT- self talk, looking at best and worst case scenario and likelihood , awareness of auto thoughts, how to cope and how to ask for help or clarification. Used CBT to relabel and reframe.  Pt denies any current SI/HI. Denies any current A/VH.     Pt is under the care of Dr. Bearden, [facility],  saw him this week and going through med change.   Current Outpatient  Medications on File Prior to Visit   Medication Sig Dispense Refill    betamethasone dipropionate 0.05 % cream Apply topically 2 (two) times daily. Use sparingly on severe eczema 45 g 0    clobetasol 0.05% (TEMOVATE) 0.05 % Oint Apply topically.      DUPIXENT  mg/2 mL PnIj       famotidine (PEPCID) 20 MG tablet Take 1 tablet (20 mg total) by mouth once daily. 30 tablet 2    hydrOXYzine HCL (ATARAX) 25 MG tablet Take 1 tablet (25 mg total) by mouth 3 (three) times daily. 30 tablet 0    loratadine (CLARITIN) 10 mg tablet Take 1 tablet (10 mg total) by mouth once daily. 90 tablet 3    sertraline (ZOLOFT) 50 MG tablet Take 0.5 tab (25mg) Q HS for four nights, then take 1 tab (50mg) Q HS for three nights, then take 2 tabs (100mg) Q HS 60 tablet 1    tacrolimus (PROTOPIC) 0.1 % ointment Apply topically 2 (two) times daily. 60 g 0    triamcinolone acetonide 0.1% (KENALOG) 0.1 % cream Apply topically 2 (two) times daily. 45 g 2    VIORELE, 28, 0.15-0.02 mgx21 /0.01 mg x 5 per tablet Take 1 tablet by mouth.      VYVANSE 30 mg capsule Take 1 capsule (30 mg total) by mouth every morning. 30 capsule 0     No current facility-administered medications on file prior to visit.      Treatment plan:  Target symptoms: depression, anxiety , adjustment, academic and family dynamics stress, social,   Why chosen therapy is appropriate versus another modality: relevant to diagnosis, patient responds to this modality, evidence based practice  Outcome monitoring methods: self-report, observation  Therapeutic intervention type: insight oriented psychotherapy, behavior modifying psychotherapy, supportive psychotherapy, interactive psychotherapy    Risk parameters:  Patient reports no suicidal ideation  Patient reports no homicidal ideation  Patient reports no self-injurious behavior  Patient reports no violent behavior    Verbal deficits: pt reports hx of speech issues, struggles with getting right words out or slowly processing. Pt  soft spoken as well.     Patient's response to intervention:  The patient's response to intervention is accepting.    Progress toward goals and other mental status changes:  The patient's progress toward goals is positive progress.    Diagnosis:     ICD-10-CM ICD-9-CM   1. MDD (major depressive disorder), recurrent episode, moderate  F33.1 296.32   2. MARIEL (generalized anxiety disorder)  F41.1 300.02   3. ADHD (attention deficit hyperactivity disorder), inattentive type  F90.0 314.00     Plan:individual psychotherapy and medication management by physician. Pt under the care of Dr. Bearden.   Treatment Goals:  Specify outcomes written in observable, behavioral terms:   Anxiety: reducing negative automatic thoughts, reducing physical symptoms of anxiety, and reducing time spent worrying (<30 minutes/day)  Depression: acquiring relapse prevention skills and reducing negative automatic thoughts     Treatment Plan/Recommendations:   Medication Management: Follow up with Dr. Bearden.  The treatment plan and follow up plan were reviewed with the patient.  2-204-909-FYPE is the suicide prevention hotline.  If there are thoughts of death, dying, or suicide please call the hotline and go to the ER for evaluation.     Return to Clinic: 1 week, earlier if needed. Pt to go to ED or call 911 if symptoms worsen or if she has thoughts of harming self and /or others. Pt verbalized understanding.   Length of Service (minutes): 60 min

## 2025-07-03 DIAGNOSIS — F90.0 ADHD (ATTENTION DEFICIT HYPERACTIVITY DISORDER), INATTENTIVE TYPE: ICD-10-CM

## 2025-07-07 RX ORDER — LISDEXAMFETAMINE DIMESYLATE 30 MG/1
30 CAPSULE ORAL EVERY MORNING
Qty: 30 CAPSULE | Refills: 0 | Status: SHIPPED | OUTPATIENT
Start: 2025-07-07

## 2025-07-07 RX ORDER — SERTRALINE HYDROCHLORIDE 50 MG/1
TABLET, FILM COATED ORAL
Qty: 60 TABLET | Refills: 1 | Status: SHIPPED | OUTPATIENT
Start: 2025-07-07

## 2025-07-15 ENCOUNTER — PATIENT MESSAGE (OUTPATIENT)
Dept: PSYCHIATRY | Facility: CLINIC | Age: 20
End: 2025-07-15
Payer: OTHER GOVERNMENT

## 2025-07-16 ENCOUNTER — OFFICE VISIT (OUTPATIENT)
Dept: PSYCHIATRY | Facility: CLINIC | Age: 20
End: 2025-07-16
Payer: OTHER GOVERNMENT

## 2025-07-16 DIAGNOSIS — F33.0 MILD EPISODE OF RECURRENT MAJOR DEPRESSIVE DISORDER: ICD-10-CM

## 2025-07-16 DIAGNOSIS — F90.0 ADHD (ATTENTION DEFICIT HYPERACTIVITY DISORDER), INATTENTIVE TYPE: Primary | ICD-10-CM

## 2025-07-16 PROCEDURE — 99212 OFFICE O/P EST SF 10 MIN: CPT | Mod: PBBFAC,PO | Performed by: PSYCHOLOGIST

## 2025-07-16 PROCEDURE — G2211 COMPLEX E/M VISIT ADD ON: HCPCS | Mod: ,,, | Performed by: PSYCHOLOGIST

## 2025-07-16 PROCEDURE — 99999 PR PBB SHADOW E&M-EST. PATIENT-LVL II: CPT | Mod: PBBFAC,,, | Performed by: PSYCHOLOGIST

## 2025-07-16 PROCEDURE — 99213 OFFICE O/P EST LOW 20 MIN: CPT | Mod: S$PBB,,, | Performed by: PSYCHOLOGIST

## 2025-07-16 RX ORDER — SERTRALINE HYDROCHLORIDE 100 MG/1
100 TABLET, FILM COATED ORAL NIGHTLY
Qty: 30 TABLET | Refills: 4 | Status: SHIPPED | OUTPATIENT
Start: 2025-07-16 | End: 2026-07-16

## 2025-07-16 RX ORDER — LISDEXAMFETAMINE DIMESYLATE 30 MG/1
30 CAPSULE ORAL EVERY MORNING
Qty: 30 CAPSULE | Refills: 0 | Status: SHIPPED | OUTPATIENT
Start: 2025-07-16

## 2025-07-16 NOTE — LETTER
July 16, 2025        Shannan Lozoya DO  [address] [facility] [address]             [facility]  [address]  [place] [address]  Phone: [phone]   Patient: Sarah Hernandez   MR Number: [id number]   YOB: 2005   Date of Visit: 7/16/2025       Dear Dr. Lozoya:    Thank you for referring Sarah Hernandez to me for evaluation. Below are the relevant portions of my assessment and plan of care.    Pt stable on Zoloft 100mg Q HS and Vyvanse 30mg Q AM. Will continue to monitor and follow.    If you have questions, please do not hesitate to call me. I look forward to following Sarah along with you.    Sincerely,      Hi Bearden, PhD, MP           CC  No Recipients

## 2025-07-16 NOTE — PROGRESS NOTES
Outpatient Psychiatry Follow-Up Visit    Clinical Status of Patient: Outpatient (Ambulatory)  07/16/2025     Chief Complaint: 20 year old female presenting today for a follow-up.       Interval History and Content of Current Session:  Interim Events/Subjective Report/Content of Current Session:  follow-up appointment.    Pt is a 20 year old female with past psychiatric hx of depression, anxiety, ADHD who presents for follow-up treatment. Pt feels better on Zoloft. Pt said that she is sleeping better and her emotions are not so flat during the day, as it was on Lexapro. Pt continues in therapy. Pt denied any other major changes or new stressors.    Past Psychiatric hx: Lexapro    Past Medical hx:   Past Medical History:   Diagnosis Date    Narcolepsy         Interim hx:  Medication changes last visit:  cross-titrated from Lexapro to Zoloft 100mg Q HS  Anxiety: moderate - increased  Depression: mild - increased     Denies suicidal/homicidal ideations.  Denies hopelessness/worthlessness.    Denies auditory/visual hallucinations      Alcohol: pt denied  Drug: pt denied  Caffeine: minimal use  Tobacco: pt denied      Review of Systems   PSYCHIATRIC: Pertinent items are noted in the narrative.        CONSTITUTIONAL: weight stable    Past Medical, Family and Social History: The patient's past medical, family and social history have been reviewed and updated as appropriate within the electronic medical record. See encounter notes.     Current Psychiatric Medication:  Zoloft 100mg Q HS and Vyvanse 30mg Q AM     Compliance: yes      Side effects: pt denied     Risk Parameters:  Patient reports no suicidal ideation  Patient reports no homicidal ideation  Patient reports no self-injurious behavior  Patient reports no violent behavior     Exam (detailed: at least 9 elements; comprehensive: all 15 elements)   Constitutional  Vitals:  Most recent vital signs, dated less than 90 days prior to this appointment, were reviewed.         General:  unremarkable, age appropriate, casual attire, good eye contact, good rapport       Musculoskeletal  Muscle Strength/Tone:  no flaccidity, no tremor    Gait & Station:  normal      Psychiatric                       Speech:  normal tone, normal rate, rhythm, and volume   Mood & Affect:   Depressed, anxious         Thought Process:   Goal directed; Linear    Associations:   intact   Thought Content:   No SI/HI, delusions, or paranoia, no AV/VH   Insight & Judgement:   Good, adequate to circumstances   Orientation:   grossly intact; alert and oriented x 4    Memory:  intact for content of interview    Language:  grossly intact, can repeat    Attention Span  : Grossly intact for content of interview   Fund of Knowledge:   intact and appropriate to age and level of education        Assessment and Diagnosis   Status/Progress: improving     Impression: Pt struggles with moderate depression and anxiety symptoms that are not well managed currently. Pt also reports attention/concentration concerns consistent with ADHD, predominantly inattentive type. Pt explained that her symptoms were present before the age of 12 and are cause impairment in more than one setting. Pt should continue in psychotherapy and psychiatric medication management.     Diagnosis(es):   1) Major Depressive Disorder, recurrent, mild  2) ADHD, predominately inattentive type    Intervention/Counseling/Treatment Plan   Medication Management:      Continue Vyvanse 30mg in the morning and Zoloft 100mg Q HS    2.  Continue in counseling      3. Call to report any worsening of symptoms or problems with the medication. Pt instructed to go to ER with thoughts of harming self, others    Psychotherapy:   Target symptoms: depression, anxiety, attention/concentration  Why chosen therapy is appropriate versus another modality: CBT used; relevant to diagnosis, patient responds to this modality  Outcome monitoring methods: self-report, observation  Therapeutic  intervention type: Cognitive Behavioral Therapy  Topics discussed/themes: building skills sets for symptom management, symptom recognition, nutrition, exercise  The patient's response to the intervention is good  Patient's response to treatment is: good.   The patient's progress toward treatment goals: improving     Return to clinic: 4 months or earlier PRN    -Cognitive-Behavioral/Supportive therapy and psychoeducation provided  -R/B/SE's of medications discussed with the pt who expresses understanding and chooses to take medications as prescribed.   -Pt instructed to call clinic, 911 or go to nearest emergency room if sxs worsen or pt is in   crisis. The pt expresses understanding.    Hi Bearden, PhD, MP     Visit today included increased complexity associated with the care of the episodic problem associated with mental health. Addressed and managed the longitudinal care of the patient due to the serious and/or complex mental health diagnosis.

## 2025-08-06 ENCOUNTER — PATIENT MESSAGE (OUTPATIENT)
Dept: PSYCHIATRY | Facility: CLINIC | Age: 20
End: 2025-08-06
Payer: OTHER GOVERNMENT

## 2025-08-06 DIAGNOSIS — F90.0 ADHD (ATTENTION DEFICIT HYPERACTIVITY DISORDER), INATTENTIVE TYPE: ICD-10-CM

## 2025-08-07 ENCOUNTER — OFFICE VISIT (OUTPATIENT)
Dept: PSYCHIATRY | Facility: CLINIC | Age: 20
End: 2025-08-07
Payer: OTHER GOVERNMENT

## 2025-08-07 DIAGNOSIS — F42.4 EXCORIATION (SKIN-PICKING) DISORDER: ICD-10-CM

## 2025-08-07 DIAGNOSIS — F41.1 GAD (GENERALIZED ANXIETY DISORDER): ICD-10-CM

## 2025-08-07 DIAGNOSIS — F33.1 MDD (MAJOR DEPRESSIVE DISORDER), RECURRENT EPISODE, MODERATE: ICD-10-CM

## 2025-08-07 DIAGNOSIS — F90.0 ADHD (ATTENTION DEFICIT HYPERACTIVITY DISORDER), INATTENTIVE TYPE: Primary | ICD-10-CM

## 2025-08-07 PROCEDURE — 99999 PR PBB SHADOW E&M-EST. PATIENT-LVL II: CPT | Mod: PBBFAC,,, | Performed by: SOCIAL WORKER

## 2025-08-07 PROCEDURE — 99212 OFFICE O/P EST SF 10 MIN: CPT | Mod: PBBFAC,PO | Performed by: SOCIAL WORKER

## 2025-08-07 PROCEDURE — 90834 PSYTX W PT 45 MINUTES: CPT | Mod: ,,, | Performed by: SOCIAL WORKER

## 2025-08-07 PROCEDURE — 90785 PSYTX COMPLEX INTERACTIVE: CPT | Mod: ,,, | Performed by: SOCIAL WORKER

## 2025-08-07 RX ORDER — LISDEXAMFETAMINE DIMESYLATE 30 MG/1
30 CAPSULE ORAL EVERY MORNING
Qty: 30 CAPSULE | Refills: 0 | Status: SHIPPED | OUTPATIENT
Start: 2025-08-07

## 2025-08-14 ENCOUNTER — OFFICE VISIT (OUTPATIENT)
Dept: PSYCHIATRY | Facility: CLINIC | Age: 20
End: 2025-08-14
Payer: OTHER GOVERNMENT

## 2025-08-14 DIAGNOSIS — F90.0 ADHD (ATTENTION DEFICIT HYPERACTIVITY DISORDER), INATTENTIVE TYPE: ICD-10-CM

## 2025-08-14 DIAGNOSIS — F33.1 MDD (MAJOR DEPRESSIVE DISORDER), RECURRENT EPISODE, MODERATE: Primary | ICD-10-CM

## 2025-08-14 DIAGNOSIS — F42.4 EXCORIATION (SKIN-PICKING) DISORDER: ICD-10-CM

## 2025-08-14 DIAGNOSIS — F41.1 GAD (GENERALIZED ANXIETY DISORDER): ICD-10-CM

## 2025-08-14 PROCEDURE — 99211 OFF/OP EST MAY X REQ PHY/QHP: CPT | Mod: PBBFAC,PO | Performed by: SOCIAL WORKER

## 2025-08-14 PROCEDURE — 90834 PSYTX W PT 45 MINUTES: CPT | Mod: ,,, | Performed by: SOCIAL WORKER

## 2025-08-14 PROCEDURE — 90785 PSYTX COMPLEX INTERACTIVE: CPT | Mod: ,,, | Performed by: SOCIAL WORKER

## 2025-08-14 PROCEDURE — 99999 PR PBB SHADOW E&M-EST. PATIENT-LVL I: CPT | Mod: PBBFAC,,, | Performed by: SOCIAL WORKER

## 2025-08-20 ENCOUNTER — PATIENT MESSAGE (OUTPATIENT)
Dept: PSYCHIATRY | Facility: CLINIC | Age: 20
End: 2025-08-20
Payer: OTHER GOVERNMENT

## 2025-08-20 PROBLEM — R29.898 LOWER EXTREMITY WEAKNESS: Status: ACTIVE | Noted: 2025-08-20

## 2025-08-20 PROBLEM — M25.551 RIGHT HIP PAIN: Status: ACTIVE | Noted: 2025-08-20

## 2025-08-20 PROBLEM — M25.561 RIGHT KNEE PAIN: Status: ACTIVE | Noted: 2025-08-20

## 2025-08-20 PROBLEM — M25.571 RIGHT ANKLE PAIN: Status: ACTIVE | Noted: 2025-08-20

## 2025-08-21 ENCOUNTER — OFFICE VISIT (OUTPATIENT)
Dept: PSYCHIATRY | Facility: CLINIC | Age: 20
End: 2025-08-21
Payer: OTHER GOVERNMENT

## 2025-08-21 DIAGNOSIS — F90.0 ADHD (ATTENTION DEFICIT HYPERACTIVITY DISORDER), INATTENTIVE TYPE: ICD-10-CM

## 2025-08-21 DIAGNOSIS — F41.1 GAD (GENERALIZED ANXIETY DISORDER): ICD-10-CM

## 2025-08-21 DIAGNOSIS — F33.1 MDD (MAJOR DEPRESSIVE DISORDER), RECURRENT EPISODE, MODERATE: Primary | ICD-10-CM

## 2025-08-21 DIAGNOSIS — F42.4 EXCORIATION (SKIN-PICKING) DISORDER: ICD-10-CM

## 2025-08-21 PROCEDURE — 90834 PSYTX W PT 45 MINUTES: CPT | Mod: ,,, | Performed by: SOCIAL WORKER

## 2025-08-21 PROCEDURE — 90785 PSYTX COMPLEX INTERACTIVE: CPT | Mod: ,,, | Performed by: SOCIAL WORKER

## 2025-08-21 PROCEDURE — 99212 OFFICE O/P EST SF 10 MIN: CPT | Mod: PBBFAC,PO | Performed by: SOCIAL WORKER

## 2025-08-21 PROCEDURE — 99999 PR PBB SHADOW E&M-EST. PATIENT-LVL II: CPT | Mod: PBBFAC,,, | Performed by: SOCIAL WORKER

## 2025-08-27 ENCOUNTER — PATIENT MESSAGE (OUTPATIENT)
Dept: PSYCHIATRY | Facility: CLINIC | Age: 20
End: 2025-08-27
Payer: OTHER GOVERNMENT

## 2025-08-28 ENCOUNTER — TELEPHONE (OUTPATIENT)
Dept: PSYCHIATRY | Facility: CLINIC | Age: 20
End: 2025-08-28
Payer: OTHER GOVERNMENT

## 2025-09-04 DIAGNOSIS — F90.0 ADHD (ATTENTION DEFICIT HYPERACTIVITY DISORDER), INATTENTIVE TYPE: ICD-10-CM

## 2025-09-04 RX ORDER — LISDEXAMFETAMINE DIMESYLATE 30 MG/1
30 CAPSULE ORAL EVERY MORNING
Qty: 30 CAPSULE | Refills: 0 | Status: SHIPPED | OUTPATIENT
Start: 2025-09-04